# Patient Record
Sex: FEMALE | Race: WHITE | Employment: UNEMPLOYED | URBAN - METROPOLITAN AREA
[De-identification: names, ages, dates, MRNs, and addresses within clinical notes are randomized per-mention and may not be internally consistent; named-entity substitution may affect disease eponyms.]

---

## 2019-08-15 ENCOUNTER — APPOINTMENT (OUTPATIENT)
Dept: CT IMAGING | Age: 54
DRG: 917 | End: 2019-08-15
Payer: MEDICARE

## 2019-08-15 ENCOUNTER — HOSPITAL ENCOUNTER (INPATIENT)
Age: 54
LOS: 2 days | Discharge: HOME | DRG: 917 | End: 2019-08-17
Attending: EMERGENCY MEDICINE | Admitting: INTERNAL MEDICINE
Payer: MEDICARE

## 2019-08-15 ENCOUNTER — APPOINTMENT (OUTPATIENT)
Dept: GENERAL RADIOLOGY | Age: 54
DRG: 917 | End: 2019-08-15
Payer: MEDICARE

## 2019-08-15 DIAGNOSIS — F06.1 CATATONIA: Primary | ICD-10-CM

## 2019-08-15 PROBLEM — F31.9 BIPOLAR 1 DISORDER (HCC): Status: ACTIVE | Noted: 2019-08-15

## 2019-08-15 PROBLEM — R41.89 UNRESPONSIVE EPISODE: Status: ACTIVE | Noted: 2019-08-15

## 2019-08-15 PROBLEM — R40.4 UNRESPONSIVE EPISODE: Status: ACTIVE | Noted: 2019-08-15

## 2019-08-15 LAB
A/G RATIO: 1.7 (ref 1.1–2.2)
ACETAMINOPHEN LEVEL: <5 UG/ML (ref 10–30)
ALBUMIN SERPL-MCNC: 4.9 G/DL (ref 3.4–5)
ALP BLD-CCNC: 88 U/L (ref 40–129)
ALT SERPL-CCNC: 9 U/L (ref 10–40)
AMPHETAMINE SCREEN, URINE: POSITIVE
ANION GAP SERPL CALCULATED.3IONS-SCNC: 12 MMOL/L (ref 3–16)
AST SERPL-CCNC: 16 U/L (ref 15–37)
BACTERIA: ABNORMAL /HPF
BARBITURATE SCREEN URINE: ABNORMAL
BASOPHILS ABSOLUTE: 0.1 K/UL (ref 0–0.2)
BASOPHILS RELATIVE PERCENT: 1.3 %
BENZODIAZEPINE SCREEN, URINE: ABNORMAL
BILIRUB SERPL-MCNC: 0.7 MG/DL (ref 0–1)
BILIRUBIN URINE: NEGATIVE
BLOOD, URINE: ABNORMAL
BUN BLDV-MCNC: 12 MG/DL (ref 7–20)
CALCIUM SERPL-MCNC: 10.2 MG/DL (ref 8.3–10.6)
CANNABINOID SCREEN URINE: ABNORMAL
CHLORIDE BLD-SCNC: 101 MMOL/L (ref 99–110)
CLARITY: CLEAR
CO2: 27 MMOL/L (ref 21–32)
COCAINE METABOLITE SCREEN URINE: POSITIVE
COLOR: YELLOW
CREAT SERPL-MCNC: 1 MG/DL (ref 0.6–1.1)
EOSINOPHILS ABSOLUTE: 0 K/UL (ref 0–0.6)
EOSINOPHILS RELATIVE PERCENT: 0.5 %
EPITHELIAL CELLS, UA: ABNORMAL /HPF
ETHANOL: NORMAL MG/DL (ref 0–0.08)
GFR AFRICAN AMERICAN: >60
GFR NON-AFRICAN AMERICAN: 58
GLOBULIN: 2.9 G/DL
GLUCOSE BLD-MCNC: 117 MG/DL (ref 70–99)
GLUCOSE BLD-MCNC: 128 MG/DL (ref 70–99)
GLUCOSE URINE: NEGATIVE MG/DL
HCG QUALITATIVE: NEGATIVE
HCT VFR BLD CALC: 42 % (ref 36–48)
HEMOGLOBIN: 14.7 G/DL (ref 12–16)
KETONES, URINE: ABNORMAL MG/DL
LEUKOCYTE ESTERASE, URINE: ABNORMAL
LYMPHOCYTES ABSOLUTE: 1.7 K/UL (ref 1–5.1)
LYMPHOCYTES RELATIVE PERCENT: 24.7 %
Lab: ABNORMAL
MCH RBC QN AUTO: 30.2 PG (ref 26–34)
MCHC RBC AUTO-ENTMCNC: 35 G/DL (ref 31–36)
MCV RBC AUTO: 86.3 FL (ref 80–100)
METHADONE SCREEN, URINE: ABNORMAL
MICROSCOPIC EXAMINATION: YES
MONOCYTES ABSOLUTE: 0.8 K/UL (ref 0–1.3)
MONOCYTES RELATIVE PERCENT: 11.6 %
MUCUS: ABNORMAL /LPF
NEUTROPHILS ABSOLUTE: 4.2 K/UL (ref 1.7–7.7)
NEUTROPHILS RELATIVE PERCENT: 61.9 %
NITRITE, URINE: POSITIVE
OPIATE SCREEN URINE: ABNORMAL
OXYCODONE URINE: ABNORMAL
PDW BLD-RTO: 12.9 % (ref 12.4–15.4)
PERFORMED ON: ABNORMAL
PH UA: 6
PH UA: 6 (ref 5–8)
PHENCYCLIDINE SCREEN URINE: ABNORMAL
PLATELET # BLD: 301 K/UL (ref 135–450)
PMV BLD AUTO: 8.3 FL (ref 5–10.5)
POTASSIUM SERPL-SCNC: 3.9 MMOL/L (ref 3.5–5.1)
PROPOXYPHENE SCREEN: ABNORMAL
PROTEIN UA: ABNORMAL MG/DL
RBC # BLD: 4.86 M/UL (ref 4–5.2)
RBC UA: ABNORMAL /HPF (ref 0–2)
SALICYLATE, SERUM: <0.3 MG/DL (ref 15–30)
SODIUM BLD-SCNC: 140 MMOL/L (ref 136–145)
SPECIFIC GRAVITY UA: 1.02 (ref 1–1.03)
TOTAL CK: 150 U/L (ref 26–192)
TOTAL CK: 151 U/L (ref 26–192)
TOTAL PROTEIN: 7.8 G/DL (ref 6.4–8.2)
TROPONIN: <0.01 NG/ML
URINE TYPE: ABNORMAL
UROBILINOGEN, URINE: 0.2 E.U./DL
WBC # BLD: 6.7 K/UL (ref 4–11)
WBC UA: ABNORMAL /HPF (ref 0–5)

## 2019-08-15 PROCEDURE — 80307 DRUG TEST PRSMV CHEM ANLYZR: CPT

## 2019-08-15 PROCEDURE — 82550 ASSAY OF CK (CPK): CPT

## 2019-08-15 PROCEDURE — 81001 URINALYSIS AUTO W/SCOPE: CPT

## 2019-08-15 PROCEDURE — 80053 COMPREHEN METABOLIC PANEL: CPT

## 2019-08-15 PROCEDURE — 71045 X-RAY EXAM CHEST 1 VIEW: CPT

## 2019-08-15 PROCEDURE — 36415 COLL VENOUS BLD VENIPUNCTURE: CPT

## 2019-08-15 PROCEDURE — 99285 EMERGENCY DEPT VISIT HI MDM: CPT

## 2019-08-15 PROCEDURE — 6360000002 HC RX W HCPCS: Performed by: EMERGENCY MEDICINE

## 2019-08-15 PROCEDURE — 93005 ELECTROCARDIOGRAM TRACING: CPT | Performed by: EMERGENCY MEDICINE

## 2019-08-15 PROCEDURE — 80175 DRUG SCREEN QUAN LAMOTRIGINE: CPT

## 2019-08-15 PROCEDURE — 96365 THER/PROPH/DIAG IV INF INIT: CPT

## 2019-08-15 PROCEDURE — C9113 INJ PANTOPRAZOLE SODIUM, VIA: HCPCS | Performed by: INTERNAL MEDICINE

## 2019-08-15 PROCEDURE — G0480 DRUG TEST DEF 1-7 CLASSES: HCPCS

## 2019-08-15 PROCEDURE — 84703 CHORIONIC GONADOTROPIN ASSAY: CPT

## 2019-08-15 PROCEDURE — 6370000000 HC RX 637 (ALT 250 FOR IP)

## 2019-08-15 PROCEDURE — 84484 ASSAY OF TROPONIN QUANT: CPT

## 2019-08-15 PROCEDURE — 2580000003 HC RX 258: Performed by: EMERGENCY MEDICINE

## 2019-08-15 PROCEDURE — 2000000000 HC ICU R&B

## 2019-08-15 PROCEDURE — 2580000003 HC RX 258: Performed by: INTERNAL MEDICINE

## 2019-08-15 PROCEDURE — 70450 CT HEAD/BRAIN W/O DYE: CPT

## 2019-08-15 PROCEDURE — 85025 COMPLETE CBC W/AUTO DIFF WBC: CPT

## 2019-08-15 PROCEDURE — 6360000002 HC RX W HCPCS: Performed by: INTERNAL MEDICINE

## 2019-08-15 RX ORDER — HALOPERIDOL 5 MG/ML
5 INJECTION INTRAMUSCULAR ONCE
Status: DISCONTINUED | OUTPATIENT
Start: 2019-08-15 | End: 2019-08-17

## 2019-08-15 RX ORDER — PANTOPRAZOLE SODIUM 40 MG/10ML
40 INJECTION, POWDER, LYOPHILIZED, FOR SOLUTION INTRAVENOUS DAILY
Status: DISCONTINUED | OUTPATIENT
Start: 2019-08-15 | End: 2019-08-17 | Stop reason: HOSPADM

## 2019-08-15 RX ORDER — CIPROFLOXACIN 2 MG/ML
400 INJECTION, SOLUTION INTRAVENOUS EVERY 12 HOURS
Status: DISCONTINUED | OUTPATIENT
Start: 2019-08-16 | End: 2019-08-16

## 2019-08-15 RX ORDER — SODIUM CHLORIDE 0.9 % (FLUSH) 0.9 %
10 SYRINGE (ML) INJECTION EVERY 12 HOURS SCHEDULED
Status: DISCONTINUED | OUTPATIENT
Start: 2019-08-15 | End: 2019-08-17 | Stop reason: HOSPADM

## 2019-08-15 RX ORDER — AMMONIA INHALANTS 0.04 G/.3ML
INHALANT RESPIRATORY (INHALATION)
Status: COMPLETED
Start: 2019-08-15 | End: 2019-08-15

## 2019-08-15 RX ORDER — SODIUM CHLORIDE 9 MG/ML
INJECTION, SOLUTION INTRAVENOUS CONTINUOUS
Status: DISCONTINUED | OUTPATIENT
Start: 2019-08-15 | End: 2019-08-17

## 2019-08-15 RX ORDER — SODIUM CHLORIDE 0.9 % (FLUSH) 0.9 %
10 SYRINGE (ML) INJECTION PRN
Status: DISCONTINUED | OUTPATIENT
Start: 2019-08-15 | End: 2019-08-17 | Stop reason: HOSPADM

## 2019-08-15 RX ORDER — ONDANSETRON 2 MG/ML
4 INJECTION INTRAMUSCULAR; INTRAVENOUS EVERY 6 HOURS PRN
Status: DISCONTINUED | OUTPATIENT
Start: 2019-08-15 | End: 2019-08-16

## 2019-08-15 RX ORDER — 0.9 % SODIUM CHLORIDE 0.9 %
1000 INTRAVENOUS SOLUTION INTRAVENOUS ONCE
Status: COMPLETED | OUTPATIENT
Start: 2019-08-15 | End: 2019-08-15

## 2019-08-15 RX ORDER — AMMONIA INHALANTS 0.04 G/.3ML
0.3 INHALANT RESPIRATORY (INHALATION) ONCE
Status: COMPLETED | OUTPATIENT
Start: 2019-08-15 | End: 2019-08-15

## 2019-08-15 RX ORDER — CIPROFLOXACIN 2 MG/ML
400 INJECTION, SOLUTION INTRAVENOUS ONCE
Status: COMPLETED | OUTPATIENT
Start: 2019-08-15 | End: 2019-08-15

## 2019-08-15 RX ADMIN — CIPROFLOXACIN 400 MG: 2 INJECTION, SOLUTION INTRAVENOUS at 14:37

## 2019-08-15 RX ADMIN — PANTOPRAZOLE SODIUM 40 MG: 40 INJECTION, POWDER, FOR SOLUTION INTRAVENOUS at 17:39

## 2019-08-15 RX ADMIN — SODIUM CHLORIDE: 9 INJECTION, SOLUTION INTRAVENOUS at 17:38

## 2019-08-15 RX ADMIN — AMMONIA INHALANTS 0.3 ML: 0.04 INHALANT RESPIRATORY (INHALATION) at 13:11

## 2019-08-15 RX ADMIN — ENOXAPARIN SODIUM 40 MG: 40 INJECTION SUBCUTANEOUS at 17:38

## 2019-08-15 RX ADMIN — Medication 0.3 ML: at 13:11

## 2019-08-15 RX ADMIN — Medication 10 ML: at 17:39

## 2019-08-15 RX ADMIN — AMMONIA INHALANTS 0.3 ML: 0.04 INHALANT RESPIRATORY (INHALATION) at 11:55

## 2019-08-15 RX ADMIN — SODIUM CHLORIDE 1000 ML: 9 INJECTION, SOLUTION INTRAVENOUS at 14:36

## 2019-08-15 NOTE — H&P
Hospital Medicine History & Physical      PCP: Socrates Davalos MD    Date of Admission: 8/15/2019    Date of Service: Pt seen/examined on 8/15/2019 and Admitted to Inpatient with expected LOS greater than two midnights due to medical therapy. Chief Complaint: Change in mental status      History Of Present Illness:    49 y.o. female who presented to [facility] with above complaint. History is from her  fiancée. She was apparently well until this morning. He dropped her at her mother's place. She went to [place] with her mother who lives in a nursing home. She became unresponsive and calcium no and they transferred her over here. She has been having these kind of episodes on and off for the last month. She had 3 episodes so far. Lastly she was managed in the ER at [facility]. Her mental status improved during the stay in the ER and she was discharged at that time. She has been tightening the jaws and all the extremities currently and unable to get any history or examine her. The history of bipolar and does not take any medications. Her all the oldest daughter  got the custody of her youngest daughter who is 15years old in last May. .. Since then she has been very upset. She snorts cocaine and takes amphetamine. Past Medical History:          Diagnosis Date    DDD (degenerative disc disease), lumbosacral 1/15/2014    Depression     Low back pain     HERNIATED DISC    Ovarian cyst        Past Surgical History:          Procedure Laterality Date    BONY PELVIS SURGERY      LEFT SIDE FOR BONE GRAFT FOR NECK    BREAST BIOPSY      RIGHT CYST NEG    ENDOMETRIAL ABLATION      LEEP      X2    NECK SURGERY      OTHER SURGICAL HISTORY      SPINAL CORD STIMULATOR    TUBAL LIGATION         Medications Prior to Admission:      Prior to Admission medications    Medication Sig Start Date End Date Taking? Authorizing Provider   fluocinonide (LIDEX) 0.05 % cream Apply topically 2 times daily. 2/25/15   Michaelle Solders, APRN - CNP   triamcinolone (KENALOG) 0.025 % cream Apply topically 2 times daily. 12/31/14   Omega Cheng MD   benztropine (COGENTIN) 1 MG tablet  5/30/14   Historical Provider, MD   ARIPiprazole (ABILIFY) 5 MG tablet Take 10 mg by mouth daily. Historical Provider, MD   traMADol (ULTRAM) 50 MG tablet Take 1 tablet by mouth every 4 hours. 1/15/14   Omega Cheng MD       Allergies:  Amoxicillin-pot clavulanate    Social History:      The patient currently lives with her boyfriend    TOBACCO:   reports that she has been smoking cigarettes. She has been smoking about 1.00 pack per day. She has never used smokeless tobacco.  ETOH:   reports that she drinks alcohol. Family History:       Reviewed in detail and negative for DM, CAD, Cancer, CVA. Positive as follows:        Problem Relation Age of Onset    Cancer Unknown     Diabetes Unknown     Heart Disease Unknown     High Blood Pressure Unknown     Stroke Unknown        REVIEW OF SYSTEMS:   Pertinent positives as noted in the HPI. All other systems reviewed and negative. PHYSICAL EXAM PERFORMED:    BP (!) 142/84   Pulse 84   Temp 99 °F (37.2 °C) (Axillary)   Resp 17   SpO2 100%     General appearance:   Clinching the jaws, all the extremities and tightening her eyes      HEENT: Could not open her eyes. Neck: . No jugular venous distention. Trachea midline. Respiratory:  Normal respiratory effort. Clear to auscultation, bilaterally without Rales/Wheezes/Rhonchi. Cardiovascular:  Regular rate and rhythm with normal S1/S2 without murmurs, rubs or gallops. Abdomen: Soft, non-tender, non-distended with normal bowel sounds. Musculoskeletal:  No clubbing, cyanosis or edema bilaterally. Tightening hands and feet , extremities rigid  skin: Skin color, texture, turgor normal.  No rashes or lesions.   Neurologic: Not responding, she responded to ammonia in the ER  Psychiatric: Catatonic state  Capillary Refill: Brisk,< 3 seconds   Peripheral Pulses: +2 palpable, equal bilaterally       Labs:     Recent Labs     08/15/19  1140   WBC 6.7   HGB 14.7   HCT 42.0        Recent Labs     08/15/19  1140      K 3.9      CO2 27   BUN 12   CREATININE 1.0   CALCIUM 10.2     Recent Labs     08/15/19  1140   AST 16   ALT 9*   BILITOT 0.7   ALKPHOS 88     No results for input(s): INR in the last 72 hours. No results for input(s): Coralyn Honour in the last 72 hours. Urinalysis:      Lab Results   Component Value Date    NITRU POSITIVE 08/15/2019    WBCUA  08/15/2019    BACTERIA 4+ 08/15/2019    RBCUA 5-10 08/15/2019    BLOODU SMALL 08/15/2019    SPECGRAV 1.025 08/15/2019    GLUCOSEU Negative 08/15/2019       Radiology:     CXR: I have reviewed the CXR with the following interpretation:   EKG:  I have reviewed the EKG with the following interpretation: Sinus tachycardia 106/min, left bundle branch block which is old    XR CHEST PORTABLE   Final Result   No acute cardiopulmonary pathology. 4 cm nodular density projected over the left lower lobe/left upper quadrant. Further evaluation with chest CT suggested on a nonemergent/outpatient basis. CT Head WO Contrast   Final Result   No acute intracranial abnormality.              ASSESSMENT:    Active Hospital Problems    Diagnosis Date Noted    Unresponsive episode [R41.89] 08/15/2019    Bipolar 1 disorder ([facility] [F31.9] 08/15/2019         PLAN:  Unresponsive , catatonic state-recurrence, mostly psychological, no abnormalities found in the work-up including CAT scan of the head, she responded to ammonia in the emergency room, CPK is normal seizure less likely, given the history of cocaine and amphetamine abuse-need to monitor closely  -Admit to ICU, IV fluids, IV Protonix, monitor electrolytes, neurochecks, neurology and psychiatric evaluation, discussed with neurology, if condition does not improve consider EEG and Keppra better not to give Ativan or Haldol    BiPolar disorder-does not take any medications    Amphetamine and cocaine abuse-telemetry monitoring, symptom medic treatment    Abnormal EI-elujew-fg urine culture and Cipro IV    Left bundle branch block- old            DVT Prophylaxis: Lovenox  Diet: N.p.o.  Code Status: Full code    PT/OT Eval Status:      Dispo -admitted to ICU  Addendum  Discussed with the nurse at the ICU- she was in a relaxed state  when no one is  in the room, is somewhat anxious she become catatonic     Martha Beasley MD    Thank you Mariajose Avelar MD for the opportunity to be involved in this patient's care. If you have any questions or concerns please feel free to contact me at [phone].

## 2019-08-15 NOTE — ED NOTES
Bed: 01  Expected date:   Expected time:   Means of arrival:   Comments:  SOLEDAD Erickson  08/15/19 1127

## 2019-08-15 NOTE — PROGRESS NOTES
Psych consult: Perfect serve gave number [phone] said Psych coverage wated to be reached at this number. Message was left.

## 2019-08-16 PROBLEM — F14.10 COCAINE ABUSE (HCC): Status: ACTIVE | Noted: 2019-08-16

## 2019-08-16 PROBLEM — F19.959 SUBSTANCE-INDUCED PSYCHOTIC DISORDER (HCC): Status: ACTIVE | Noted: 2019-08-16

## 2019-08-16 PROBLEM — F15.10 METHAMPHETAMINE ABUSE (HCC): Chronic | Status: ACTIVE | Noted: 2019-08-16

## 2019-08-16 PROBLEM — F43.10 PTSD (POST-TRAUMATIC STRESS DISORDER): Status: ACTIVE | Noted: 2019-08-16

## 2019-08-16 LAB
ALBUMIN SERPL-MCNC: 3.9 G/DL (ref 3.4–5)
ALP BLD-CCNC: 72 U/L (ref 40–129)
ALT SERPL-CCNC: 10 U/L (ref 10–40)
ANION GAP SERPL CALCULATED.3IONS-SCNC: 9 MMOL/L (ref 3–16)
AST SERPL-CCNC: 18 U/L (ref 15–37)
BASOPHILS ABSOLUTE: 0.1 K/UL (ref 0–0.2)
BASOPHILS RELATIVE PERCENT: 1.2 %
BILIRUB SERPL-MCNC: 0.8 MG/DL (ref 0–1)
BILIRUBIN DIRECT: <0.2 MG/DL (ref 0–0.3)
BILIRUBIN, INDIRECT: NORMAL MG/DL (ref 0–1)
BUN BLDV-MCNC: 9 MG/DL (ref 7–20)
CALCIUM SERPL-MCNC: 9.2 MG/DL (ref 8.3–10.6)
CHLORIDE BLD-SCNC: 108 MMOL/L (ref 99–110)
CO2: 24 MMOL/L (ref 21–32)
CREAT SERPL-MCNC: 0.8 MG/DL (ref 0.6–1.1)
EKG ATRIAL RATE: 106 BPM
EKG DIAGNOSIS: NORMAL
EKG P AXIS: 77 DEGREES
EKG P-R INTERVAL: 128 MS
EKG Q-T INTERVAL: 386 MS
EKG QRS DURATION: 120 MS
EKG QTC CALCULATION (BAZETT): 512 MS
EKG R AXIS: -21 DEGREES
EKG T AXIS: 100 DEGREES
EKG VENTRICULAR RATE: 106 BPM
EOSINOPHILS ABSOLUTE: 0.1 K/UL (ref 0–0.6)
EOSINOPHILS RELATIVE PERCENT: 2.1 %
GFR AFRICAN AMERICAN: >60
GFR NON-AFRICAN AMERICAN: >60
GLUCOSE BLD-MCNC: 101 MG/DL (ref 70–99)
HCT VFR BLD CALC: 38.8 % (ref 36–48)
HEMOGLOBIN: 13.3 G/DL (ref 12–16)
LACTIC ACID: 0.7 MMOL/L (ref 0.4–2)
LYMPHOCYTES ABSOLUTE: 2.1 K/UL (ref 1–5.1)
LYMPHOCYTES RELATIVE PERCENT: 39.3 %
MAGNESIUM: 2 MG/DL (ref 1.8–2.4)
MCH RBC QN AUTO: 29.8 PG (ref 26–34)
MCHC RBC AUTO-ENTMCNC: 34.3 G/DL (ref 31–36)
MCV RBC AUTO: 87.1 FL (ref 80–100)
MONOCYTES ABSOLUTE: 0.6 K/UL (ref 0–1.3)
MONOCYTES RELATIVE PERCENT: 12.1 %
NEUTROPHILS ABSOLUTE: 2.4 K/UL (ref 1.7–7.7)
NEUTROPHILS RELATIVE PERCENT: 45.3 %
PDW BLD-RTO: 12.6 % (ref 12.4–15.4)
PLATELET # BLD: 257 K/UL (ref 135–450)
PMV BLD AUTO: 8.4 FL (ref 5–10.5)
POTASSIUM REFLEX MAGNESIUM: 3.7 MMOL/L (ref 3.5–5.1)
RBC # BLD: 4.45 M/UL (ref 4–5.2)
SODIUM BLD-SCNC: 141 MMOL/L (ref 136–145)
TOTAL PROTEIN: 6.4 G/DL (ref 6.4–8.2)
WBC # BLD: 5.4 K/UL (ref 4–11)

## 2019-08-16 PROCEDURE — 80076 HEPATIC FUNCTION PANEL: CPT

## 2019-08-16 PROCEDURE — 6370000000 HC RX 637 (ALT 250 FOR IP): Performed by: INTERNAL MEDICINE

## 2019-08-16 PROCEDURE — 93010 ELECTROCARDIOGRAM REPORT: CPT | Performed by: INTERNAL MEDICINE

## 2019-08-16 PROCEDURE — 85025 COMPLETE CBC W/AUTO DIFF WBC: CPT

## 2019-08-16 PROCEDURE — 2580000003 HC RX 258: Performed by: INTERNAL MEDICINE

## 2019-08-16 PROCEDURE — 83735 ASSAY OF MAGNESIUM: CPT

## 2019-08-16 PROCEDURE — 80048 BASIC METABOLIC PNL TOTAL CA: CPT

## 2019-08-16 PROCEDURE — 6360000002 HC RX W HCPCS: Performed by: INTERNAL MEDICINE

## 2019-08-16 PROCEDURE — 83605 ASSAY OF LACTIC ACID: CPT

## 2019-08-16 PROCEDURE — 36415 COLL VENOUS BLD VENIPUNCTURE: CPT

## 2019-08-16 PROCEDURE — 1200000000 HC SEMI PRIVATE

## 2019-08-16 PROCEDURE — 99223 1ST HOSP IP/OBS HIGH 75: CPT | Performed by: PSYCHIATRY & NEUROLOGY

## 2019-08-16 PROCEDURE — C9113 INJ PANTOPRAZOLE SODIUM, VIA: HCPCS | Performed by: INTERNAL MEDICINE

## 2019-08-16 PROCEDURE — 99222 1ST HOSP IP/OBS MODERATE 55: CPT | Performed by: PSYCHIATRY & NEUROLOGY

## 2019-08-16 PROCEDURE — 87086 URINE CULTURE/COLONY COUNT: CPT

## 2019-08-16 RX ORDER — DOXYCYCLINE HYCLATE 100 MG
100 TABLET ORAL EVERY 12 HOURS SCHEDULED
Status: DISCONTINUED | OUTPATIENT
Start: 2019-08-16 | End: 2019-08-17

## 2019-08-16 RX ORDER — PROCHLORPERAZINE EDISYLATE 5 MG/ML
10 INJECTION INTRAMUSCULAR; INTRAVENOUS EVERY 6 HOURS PRN
Status: DISCONTINUED | OUTPATIENT
Start: 2019-08-16 | End: 2019-08-17 | Stop reason: HOSPADM

## 2019-08-16 RX ORDER — RISPERIDONE 1 MG/1
1 TABLET, FILM COATED ORAL 2 TIMES DAILY
Status: DISCONTINUED | OUTPATIENT
Start: 2019-08-16 | End: 2019-08-17 | Stop reason: HOSPADM

## 2019-08-16 RX ORDER — HALOPERIDOL 5 MG/ML
5 INJECTION INTRAMUSCULAR ONCE
Status: COMPLETED | OUTPATIENT
Start: 2019-08-16 | End: 2019-08-16

## 2019-08-16 RX ORDER — HYDROXYZINE PAMOATE 50 MG/1
50 CAPSULE ORAL 4 TIMES DAILY PRN
COMMUNITY

## 2019-08-16 RX ORDER — RISPERIDONE 1 MG/1
1 TABLET, FILM COATED ORAL 2 TIMES DAILY
COMMUNITY

## 2019-08-16 RX ORDER — SODIUM CHLORIDE 9 MG/ML
INJECTION, SOLUTION INTRAVENOUS CONTINUOUS
Status: CANCELLED | OUTPATIENT
Start: 2019-08-16

## 2019-08-16 RX ORDER — LAMOTRIGINE 25 MG/1
25 TABLET ORAL 2 TIMES DAILY
Status: DISCONTINUED | OUTPATIENT
Start: 2019-08-16 | End: 2019-08-17 | Stop reason: HOSPADM

## 2019-08-16 RX ORDER — LAMOTRIGINE 25 MG/1
25 TABLET ORAL 2 TIMES DAILY
COMMUNITY

## 2019-08-16 RX ORDER — NICOTINE 21 MG/24HR
1 PATCH, TRANSDERMAL 24 HOURS TRANSDERMAL DAILY
Status: DISCONTINUED | OUTPATIENT
Start: 2019-08-16 | End: 2019-08-17 | Stop reason: HOSPADM

## 2019-08-16 RX ORDER — HYDROXYZINE PAMOATE 25 MG/1
50 CAPSULE ORAL 4 TIMES DAILY PRN
Status: DISCONTINUED | OUTPATIENT
Start: 2019-08-16 | End: 2019-08-17 | Stop reason: HOSPADM

## 2019-08-16 RX ADMIN — DOXYCYCLINE HYCLATE 100 MG: 100 TABLET, COATED ORAL at 20:54

## 2019-08-16 RX ADMIN — SODIUM CHLORIDE: 9 INJECTION, SOLUTION INTRAVENOUS at 01:47

## 2019-08-16 RX ADMIN — LAMOTRIGINE 25 MG: 25 TABLET ORAL at 11:02

## 2019-08-16 RX ADMIN — PANTOPRAZOLE SODIUM 40 MG: 40 INJECTION, POWDER, FOR SOLUTION INTRAVENOUS at 08:38

## 2019-08-16 RX ADMIN — MUPIROCIN: 20 OINTMENT TOPICAL at 08:37

## 2019-08-16 RX ADMIN — CIPROFLOXACIN 400 MG: 2 INJECTION, SOLUTION INTRAVENOUS at 04:05

## 2019-08-16 RX ADMIN — HALOPERIDOL LACTATE 5 MG: 5 INJECTION INTRAMUSCULAR at 00:48

## 2019-08-16 RX ADMIN — Medication 10 ML: at 08:38

## 2019-08-16 RX ADMIN — RISPERIDONE 1 MG: 1 TABLET, FILM COATED ORAL at 20:54

## 2019-08-16 RX ADMIN — ENOXAPARIN SODIUM 40 MG: 40 INJECTION SUBCUTANEOUS at 08:37

## 2019-08-16 RX ADMIN — Medication 10 ML: at 20:55

## 2019-08-16 RX ADMIN — RISPERIDONE 1 MG: 1 TABLET, FILM COATED ORAL at 11:03

## 2019-08-16 RX ADMIN — LAMOTRIGINE 25 MG: 25 TABLET ORAL at 20:54

## 2019-08-16 RX ADMIN — DOXYCYCLINE HYCLATE 100 MG: 100 TABLET, COATED ORAL at 11:02

## 2019-08-16 ASSESSMENT — PAIN SCALES - GENERAL: PAINLEVEL_OUTOF10: 0

## 2019-08-16 NOTE — PLAN OF CARE
Problem: Falls - Risk of:  Goal: Will remain free from falls  Description  Will remain free from falls  8/16/2019 1010 by Suni Doll RN  Outcome: Ongoing  Note:   Pt remains free from fall and injury. Non-skid slippers on. Fall risk band on wrist, bed alarm in use. Instructed to call for assistance. Call light in reach, will monitor.     8/15/2019 2038 by Melody Siu RN  Outcome: Ongoing  Goal: Absence of physical injury  Description  Absence of physical injury  8/15/2019 2038 by Melody Siu RN  Outcome: Ongoing

## 2019-08-16 NOTE — CARE COORDINATION
Sw reviewed chart on this day and notes that psych has been consulted. Pt has Medicare and Medicaid which should reduce any potential barriers to discharge planning needs. Sw will follow and assist as able.

## 2019-08-16 NOTE — PROGRESS NOTES
([facility] [F19.10]     Unresponsive episode [R41.89] 08/15/2019    Bipolar 1 disorder ([facility] [F31.9] 08/15/2019     Assessment/Plan:  1. Acute Encephalopathy likely Toxic in the setting of Polysubstance abuse (Coaine, Amphetamine , Tobacco )   -CT head negative . Possibly underlying psychiatric condition could also be playing a role.  -Labs unremarkable except for UDS positive for cocaine and amphetamine  -Neurology and psychiatric consulted from ED -await recommendations  -Counseled about abstinence and ordered nicotine patch     2. Bipolar schizophrenia - -Patient reports that she follows with [facility] behavioral health and her fiancé reports patient has been treated for bipolar and schizophrenia . Patient reports that she has been weaned off Abilify and currently on risperidone and  Lamictal    3. Abnormal UA- follow-up urine culture and empirically started on Cipro IV-which got transition to doxycycline given prolonged QTC on admission EKG   -Monitor on telemetry ; DC antibiotics if urine cultures negative.     DVT Prophylaxis: Lovenox  Diet: DIET GENERAL;  Code Status: Full Code    PT/OT Eval Status: Consulted    Dispo -likely tomorrow pending urine cultures and PT OT evaluation for discharge needs; Subspeciality recs        The note was completed using Dragon -speech recognition software & EMR  . Every effort was made to ensure accuracy; however, inadvertent computerized transcription errors may be present.     Latonia Spring MD

## 2019-08-16 NOTE — CONSULTS
Psychiatry Consult    Dx:   1. Methamphetamine/Cocaine Abuse  2. Bipolar DO per history  3. PTSD    Rec:  1. Met with patient and her fiabhinav, and patient has years of methamphetamine abuse, as he will use daily until her Y[employer] Spark runs out. \" She denied buying cocaine but david uses cocaine and gives it to her . 2. Currently in treatment at [facility], and was in [facility] for inpatient care 4/2019 and also drug treatment there but has not followed through. 3. David stated that patient has episodes of not responding, tightens up body, and hears voices. Patient described voices daily of various people that she knows. 4. At this point patient's symptoms are consistent with methamphetamine abuse. 5. Recommended outpt drug treatment in addition to her psychiatric care. Patient does not appear committed to treatment. 6. Does not require inpatient psychiatric tx at this time and recommend dc home once medically stable.
Sherri, 08/15/2019;  VITAL SIGNS:  Temperature 97.6, pulse 56, respirations 14, blood  pressure 118/80. MENTAL STATUS EXAMINATION:  The patient is a 67-year-old female who is  in bed. She appeared well nourished. She was present with her fiance. She spoke freely and openly. Her thoughts were coherent and logical.   Her affect was constricted. She said her mood was okay. Insight and  judgment impaired. She denied auditory or visual hallucinations. Nor  any suicidal or homicidal ideation at this time. Fund of knowledge and  language was fair. Attention and concentration was fair. Able to  recall 3 objects immediately. She did not show any abnormal movements. She did not appear to be confused and was cooperative and pleasant. DIAGNOSES:  AXIS I:  1. Substance-induced psychotic disorder, resolved. 2.  Methamphetamine abuse. 3.  Cocaine abuse. 4.  Bipolar disorder per history. 5.  Posttraumatic stress disorder. AXIS II: Deferred. AXIS III:  1. Degenerative disk disease. 2. Recent catatonia. AXIS IV: Moderate. AXIS V: 50. PLAN:  1. Continue with Risperdal 1 mg b.i.d.  2.  Encourage outpatient treatment with her psychiatrist and therapist  at [facility]. 3.  See recommendations in chart. I do have some concerns because the  patient does not appear to be committed to treatment and certainly not  for substance abuse. I also have concerns that william maybe supplying  her with certain medications at times including benzodiazepines when  anxious and cocaine. Many of her behaviors, periods of agitation and  psychotic symptoms maybe directly related to her abusive stimulants. Spent approximately 110 minutes on this evaluation, more than 50% of the  time discussing the patient's care and treatment options.         Shabana Mcclure MD    D: 08/16/2019 12:29:10       T: 08/16/2019 14:53:53     [email or web address]  Job#: [id number]     Doc#: [id number]    CC:

## 2019-08-17 VITALS
TEMPERATURE: 98 F | HEART RATE: 63 BPM | RESPIRATION RATE: 16 BRPM | BODY MASS INDEX: 23.23 KG/M2 | DIASTOLIC BLOOD PRESSURE: 71 MMHG | SYSTOLIC BLOOD PRESSURE: 108 MMHG | WEIGHT: 162.26 LBS | HEIGHT: 70 IN | OXYGEN SATURATION: 99 %

## 2019-08-17 LAB
LAMOTRIGINE LEVEL: 1.3 UG/ML (ref 2.5–15)
URINE CULTURE, ROUTINE: NORMAL

## 2019-08-17 PROCEDURE — 97165 OT EVAL LOW COMPLEX 30 MIN: CPT

## 2019-08-17 PROCEDURE — 6370000000 HC RX 637 (ALT 250 FOR IP): Performed by: INTERNAL MEDICINE

## 2019-08-17 PROCEDURE — 97161 PT EVAL LOW COMPLEX 20 MIN: CPT

## 2019-08-17 PROCEDURE — C9113 INJ PANTOPRAZOLE SODIUM, VIA: HCPCS | Performed by: INTERNAL MEDICINE

## 2019-08-17 PROCEDURE — 6360000002 HC RX W HCPCS: Performed by: INTERNAL MEDICINE

## 2019-08-17 RX ADMIN — ENOXAPARIN SODIUM 40 MG: 40 INJECTION SUBCUTANEOUS at 09:19

## 2019-08-17 RX ADMIN — RISPERIDONE 1 MG: 1 TABLET, FILM COATED ORAL at 09:19

## 2019-08-17 RX ADMIN — LAMOTRIGINE 25 MG: 25 TABLET ORAL at 09:19

## 2019-08-17 RX ADMIN — PANTOPRAZOLE SODIUM 40 MG: 40 INJECTION, POWDER, FOR SOLUTION INTRAVENOUS at 09:18

## 2019-08-17 ASSESSMENT — PAIN SCALES - GENERAL
PAINLEVEL_OUTOF10: 0
PAINLEVEL_OUTOF10: 0

## 2019-08-17 NOTE — PROGRESS NOTES
Physical Therapy    Facility/Department: [place] [address] [facility]  Initial Assessment and Discharge Summary    NAME: Rosibel Garza  : 1965  MRN: [id number]    Date of Service: 2019    Discharge Recommendations:  Home with assist PRN   PT Equipment Recommendations  Equipment Needed: No    Assessment   Assessment: Patient is a 49year old female presenting to the hospital due to altered mental status. She ambulated 250 ft and completed a flight of stairs independently. She does not need skilled therapy in the acute setting to DC home safely. Decision Making: Low Complexity  PT Education: PT Role;General Safety  Barriers to Learning: None  No Skilled PT: Independent with functional mobility   REQUIRES PT FOLLOW UP: No  Activity Tolerance  Activity Tolerance: Patient Tolerated treatment well       Patient Diagnosis(es): The encounter diagnosis was Catatonia. has a past medical history of DDD (degenerative disc disease), lumbosacral, Depression, Low back pain, and Ovarian cyst.   has a past surgical history that includes Neck surgery; Tubal ligation; Bony pelvis surgery; LEEP; Breast biopsy; Endometrial ablation; and other surgical history. Restrictions  Restrictions/Precautions  Restrictions/Precautions: General Precautions  Required Braces or Orthoses?: No     Vision/Hearing  Vision: Impaired  Vision Exceptions: Wears glasses for reading  Hearing: Within functional limits       Subjective  General  Chart Reviewed: Yes  Patient assessed for rehabilitation services?: Yes  Referring Practitioner: Roberto Braun  Referral Date : 19  Diagnosis: altered mental status  Follows Commands: Within Functional Limits  General Comment  Comments: RN cleared pt for therapy eval.  Subjective  Subjective: Patient resting in bed upon therapy arrival.  Patient agreeable to therapy session.   Pain Screening  Patient Currently in Pain: Denies     Orientation  Orientation  Overall Orientation Status: Inpatient CMS G-Code Modifier : [facility] (08/17/19 1206)    Therapy Time   Individual Concurrent Group Co-treatment   Time In 1021         Time Out 1032         Minutes 11         Timed Code Treatment Minutes: 0 Minutes       Pranav Khalil, DPT #[id number]

## 2019-08-17 NOTE — PLAN OF CARE
Problem: Tobacco Use:  Goal: Inpatient tobacco use cessation counseling participation  Description  Inpatient tobacco use cessation counseling participation  Outcome: Ongoing     Pt educated on the importance of smoking cessation and the health benefits of it. Pt given education materials on this and dicussed with pt alternative methods of quitting such as nicotine patches.

## 2019-08-17 NOTE — PROGRESS NOTES
Occupational Therapy   Occupational Therapy Initial Assessment/Discharge Summary  1x only  Date: 2019   Patient Name: Danay Greer  MRN: [id number]     : 1965    Date of Service: 2019    Discharge Recommendations:  Home with assist PRN       Assessment    Patient admitted s/p fall and unresponsiveness. Today patient independent with acute ADLs, mobility and transfers. No further OT needs, will sign off. Prognosis: Good  Decision Making: Low Complexity  OT Education: OT Role;Plan of Care  No Skilled OT: No OT goals identified; At baseline function  REQUIRES OT FOLLOW UP: No  Activity Tolerance  Activity Tolerance: Patient Tolerated treatment well           Patient Diagnosis(es): The encounter diagnosis was Catatonia. has a past medical history of DDD (degenerative disc disease), lumbosacral, Depression, Low back pain, and Ovarian cyst.   has a past surgical history that includes Neck surgery; Tubal ligation; Bony pelvis surgery; LEEP; Breast biopsy; Endometrial ablation; and other surgical history. Restrictions  Restrictions/Precautions  Restrictions/Precautions: General Precautions  Required Braces or Orthoses?: No    Subjective   General  Chart Reviewed: Yes  Patient assessed for rehabilitation services?: Yes  Family / Caregiver Present: No  Referring Practitioner: Latonia Spring MD 19  Diagnosis: Acute Encephalopathy likely Toxic in the setting of Polysubstance abuse   Subjective  Subjective: Pt pleasant and agreeable to OT evaluations.  \"I'm doing a lot better\"  Patient Currently in Pain: Denies  Pain Assessment  Pain Assessment: 0-10  Pain Level: 0  Vital Signs  Patient Currently in Pain: Denies  Social/Functional History  Social/Functional History  Lives With: Spouse(fiance)  Type of Home: (condo)  Home Layout: Multi-level(stays in basement)  Home Access: Stairs to enter with rails  Entrance Stairs - Number of Steps: 1 threshold step + 5 stairs to

## 2019-08-19 NOTE — RESULT ENCOUNTER NOTE
Inform patient that her Lamictal level is subtherapeutic, and she should discuss with her prescribing doctor, level is 1.3.

## 2020-10-02 ENCOUNTER — HOSPITAL ENCOUNTER (EMERGENCY)
Age: 55
Discharge: ANOTHER HOSPITAL | End: 2020-10-03
Attending: EMERGENCY MEDICINE
Payer: MEDICARE

## 2020-10-02 ENCOUNTER — APPOINTMENT (OUTPATIENT)
Dept: GENERAL RADIOLOGY | Age: 55
End: 2020-10-02
Payer: MEDICARE

## 2020-10-02 LAB
A/G RATIO: 1.6 (ref 1.1–2.2)
ACETAMINOPHEN LEVEL: <5 UG/ML (ref 10–30)
ALBUMIN SERPL-MCNC: 4.4 G/DL (ref 3.4–5)
ALP BLD-CCNC: 94 U/L (ref 40–129)
ALT SERPL-CCNC: 48 U/L (ref 10–40)
ANION GAP SERPL CALCULATED.3IONS-SCNC: 14 MMOL/L (ref 3–16)
AST SERPL-CCNC: 44 U/L (ref 15–37)
BASOPHILS ABSOLUTE: 0.1 K/UL (ref 0–0.2)
BASOPHILS RELATIVE PERCENT: 0.8 %
BILIRUB SERPL-MCNC: 0.8 MG/DL (ref 0–1)
BUN BLDV-MCNC: 13 MG/DL (ref 7–20)
CALCIUM SERPL-MCNC: 9.8 MG/DL (ref 8.3–10.6)
CHLORIDE BLD-SCNC: 100 MMOL/L (ref 99–110)
CO2: 22 MMOL/L (ref 21–32)
CREAT SERPL-MCNC: 1.1 MG/DL (ref 0.6–1.1)
EOSINOPHILS ABSOLUTE: 0.1 K/UL (ref 0–0.6)
EOSINOPHILS RELATIVE PERCENT: 0.7 %
ETHANOL: NORMAL MG/DL (ref 0–0.08)
GFR AFRICAN AMERICAN: >60
GFR NON-AFRICAN AMERICAN: 52
GLOBULIN: 2.8 G/DL
GLUCOSE BLD-MCNC: 108 MG/DL (ref 70–99)
HCT VFR BLD CALC: 43.3 % (ref 36–48)
HEMOGLOBIN: 14.5 G/DL (ref 12–16)
LYMPHOCYTES ABSOLUTE: 1.4 K/UL (ref 1–5.1)
LYMPHOCYTES RELATIVE PERCENT: 16.7 %
MCH RBC QN AUTO: 29.6 PG (ref 26–34)
MCHC RBC AUTO-ENTMCNC: 33.5 G/DL (ref 31–36)
MCV RBC AUTO: 88.4 FL (ref 80–100)
MONOCYTES ABSOLUTE: 1.3 K/UL (ref 0–1.3)
MONOCYTES RELATIVE PERCENT: 15.7 %
NEUTROPHILS ABSOLUTE: 5.6 K/UL (ref 1.7–7.7)
NEUTROPHILS RELATIVE PERCENT: 66.1 %
PDW BLD-RTO: 12.9 % (ref 12.4–15.4)
PLATELET # BLD: 275 K/UL (ref 135–450)
PMV BLD AUTO: 8.3 FL (ref 5–10.5)
POTASSIUM REFLEX MAGNESIUM: 3.7 MMOL/L (ref 3.5–5.1)
RBC # BLD: 4.9 M/UL (ref 4–5.2)
SALICYLATE, SERUM: <0.3 MG/DL (ref 15–30)
SARS-COV-2, NAAT: NOT DETECTED
SODIUM BLD-SCNC: 136 MMOL/L (ref 136–145)
TOTAL PROTEIN: 7.2 G/DL (ref 6.4–8.2)
WBC # BLD: 8.4 K/UL (ref 4–11)

## 2020-10-02 PROCEDURE — 85025 COMPLETE CBC W/AUTO DIFF WBC: CPT

## 2020-10-02 PROCEDURE — U0002 COVID-19 LAB TEST NON-CDC: HCPCS

## 2020-10-02 PROCEDURE — G0480 DRUG TEST DEF 1-7 CLASSES: HCPCS

## 2020-10-02 PROCEDURE — 96372 THER/PROPH/DIAG INJ SC/IM: CPT

## 2020-10-02 PROCEDURE — 99284 EMERGENCY DEPT VISIT MOD MDM: CPT

## 2020-10-02 PROCEDURE — 93005 ELECTROCARDIOGRAM TRACING: CPT | Performed by: EMERGENCY MEDICINE

## 2020-10-02 PROCEDURE — 6360000002 HC RX W HCPCS: Performed by: PHYSICIAN ASSISTANT

## 2020-10-02 PROCEDURE — 80053 COMPREHEN METABOLIC PANEL: CPT

## 2020-10-02 PROCEDURE — 72100 X-RAY EXAM L-S SPINE 2/3 VWS: CPT

## 2020-10-02 RX ORDER — LORAZEPAM 2 MG/ML
2 INJECTION INTRAMUSCULAR ONCE
Status: COMPLETED | OUTPATIENT
Start: 2020-10-02 | End: 2020-10-02

## 2020-10-02 RX ORDER — DIPHENHYDRAMINE HYDROCHLORIDE 50 MG/ML
50 INJECTION INTRAMUSCULAR; INTRAVENOUS ONCE
Status: COMPLETED | OUTPATIENT
Start: 2020-10-02 | End: 2020-10-02

## 2020-10-02 RX ORDER — LORAZEPAM 2 MG/ML
INJECTION INTRAMUSCULAR
Status: DISCONTINUED
Start: 2020-10-02 | End: 2020-10-03 | Stop reason: HOSPADM

## 2020-10-02 RX ORDER — DIPHENHYDRAMINE HYDROCHLORIDE 50 MG/ML
INJECTION INTRAMUSCULAR; INTRAVENOUS
Status: DISCONTINUED
Start: 2020-10-02 | End: 2020-10-03 | Stop reason: HOSPADM

## 2020-10-02 RX ORDER — HALOPERIDOL 5 MG/ML
INJECTION INTRAMUSCULAR
Status: DISCONTINUED
Start: 2020-10-02 | End: 2020-10-03 | Stop reason: HOSPADM

## 2020-10-02 RX ORDER — HALOPERIDOL 5 MG/ML
5 INJECTION INTRAMUSCULAR ONCE
Status: COMPLETED | OUTPATIENT
Start: 2020-10-02 | End: 2020-10-02

## 2020-10-02 RX ADMIN — HALOPERIDOL LACTATE 5 MG: 5 INJECTION INTRAMUSCULAR at 23:08

## 2020-10-02 RX ADMIN — DIPHENHYDRAMINE HYDROCHLORIDE 50 MG: 50 INJECTION, SOLUTION INTRAMUSCULAR; INTRAVENOUS at 23:08

## 2020-10-02 RX ADMIN — LORAZEPAM 2 MG: 2 INJECTION, SOLUTION INTRAMUSCULAR; INTRAVENOUS at 23:08

## 2020-10-02 ASSESSMENT — ENCOUNTER SYMPTOMS
SHORTNESS OF BREATH: 0
VOMITING: 0
NAUSEA: 0
ABDOMINAL PAIN: 0

## 2020-10-03 VITALS
BODY MASS INDEX: 25.92 KG/M2 | DIASTOLIC BLOOD PRESSURE: 65 MMHG | RESPIRATION RATE: 18 BRPM | TEMPERATURE: 97.8 F | SYSTOLIC BLOOD PRESSURE: 90 MMHG | HEIGHT: 69 IN | HEART RATE: 91 BPM | OXYGEN SATURATION: 100 % | WEIGHT: 175 LBS

## 2020-10-03 LAB
AMPHETAMINE SCREEN, URINE: POSITIVE
BACTERIA: ABNORMAL /HPF
BARBITURATE SCREEN URINE: ABNORMAL
BENZODIAZEPINE SCREEN, URINE: ABNORMAL
BILIRUBIN URINE: NEGATIVE
BLOOD, URINE: ABNORMAL
CANNABINOID SCREEN URINE: ABNORMAL
CLARITY: CLEAR
COCAINE METABOLITE SCREEN URINE: ABNORMAL
COLOR: YELLOW
EKG ATRIAL RATE: 88 BPM
EKG DIAGNOSIS: NORMAL
EKG P AXIS: 61 DEGREES
EKG P-R INTERVAL: 134 MS
EKG Q-T INTERVAL: 428 MS
EKG QRS DURATION: 122 MS
EKG QTC CALCULATION (BAZETT): 517 MS
EKG R AXIS: -3 DEGREES
EKG T AXIS: 92 DEGREES
EKG VENTRICULAR RATE: 88 BPM
EPITHELIAL CELLS, UA: 0 /HPF (ref 0–5)
GLUCOSE URINE: NEGATIVE MG/DL
HYALINE CASTS: 0 /LPF (ref 0–8)
KETONES, URINE: NEGATIVE MG/DL
LEUKOCYTE ESTERASE, URINE: ABNORMAL
Lab: ABNORMAL
METHADONE SCREEN, URINE: ABNORMAL
MICROSCOPIC EXAMINATION: YES
NITRITE, URINE: POSITIVE
OPIATE SCREEN URINE: ABNORMAL
OXYCODONE URINE: ABNORMAL
PH UA: 6
PH UA: 6 (ref 5–8)
PHENCYCLIDINE SCREEN URINE: ABNORMAL
PROPOXYPHENE SCREEN: ABNORMAL
PROTEIN UA: NEGATIVE MG/DL
RBC UA: 0 /HPF (ref 0–4)
SPECIFIC GRAVITY UA: 1.01 (ref 1–1.03)
URINE REFLEX TO CULTURE: ABNORMAL
URINE TYPE: ABNORMAL
UROBILINOGEN, URINE: 0.2 E.U./DL
WBC UA: 3 /HPF (ref 0–5)

## 2020-10-03 PROCEDURE — 6370000000 HC RX 637 (ALT 250 FOR IP): Performed by: EMERGENCY MEDICINE

## 2020-10-03 PROCEDURE — 80307 DRUG TEST PRSMV CHEM ANLYZR: CPT

## 2020-10-03 PROCEDURE — 81001 URINALYSIS AUTO W/SCOPE: CPT

## 2020-10-03 PROCEDURE — 93010 ELECTROCARDIOGRAM REPORT: CPT | Performed by: INTERNAL MEDICINE

## 2020-10-03 RX ORDER — NITROFURANTOIN 25; 75 MG/1; MG/1
100 CAPSULE ORAL ONCE
Status: COMPLETED | OUTPATIENT
Start: 2020-10-03 | End: 2020-10-03

## 2020-10-03 RX ADMIN — NITROFURANTOIN MONOHYDRATE/MACROCRYSTALLINE 100 MG: 25; 75 CAPSULE ORAL at 01:41

## 2020-10-03 NOTE — ED PROVIDER NOTES
[facility]        Pt Name: Ayesha Francis  MRN: [id number]  [place] 1965  Date of evaluation: 10/2/2020  Provider: Blanco Pruitt PA-C  PCP: Robert Mc MD     I have seen and evaluated this patient with my supervising physician Madison Valenzuela, [address] [place] [address]       Chief Complaint   Patient presents with    Psychiatric Evaluation     Pt presents to the ED by [place] [employer], pt attempting to break into cars and jump out in front of moving vehicles, Pt has extenisve psych HX. Per PD but is hallucinating, stating that the devil is after her. Pt told this RN she is afriad to go home and does not feel safe. Pt placed on hold by PD at [place] [address]   (Location, Timing/Onset, Context/Setting, Quality, Duration, Modifying Factors, Severity, Associated Signs and Symptoms)  Note limiting factors. Ayesha Francis is a 48 y.o. female patient presents emergency department for evaluation of dangerous behavior. Patient states that she went to the police department earlier today as she was afraid to get home. Patient states she is currently living with the devil. Patient states that she has a history of bipolar and that she is taking her medications but does not want to tell me what her medications are until her  arrives. I asked the patient why she had gone to the police station and she states she would not tell me until her  arrives. Per the police officers who are here and had signed a 72-hour hold on this patient, they received multiple phone calls from various citizens stating that she was trying to break into cars and yelling at people. They state that she was running in and out of traffic. They did try and detain the patient however she ran away and when they found her again she told them she was also living with the devil and seeing the devil.   They have seen this patient before and Social History     Tobacco Use    Smoking status: Current Every Day Smoker     Packs/day: 1.00     Types: Cigarettes    Smokeless tobacco: Never Used   Substance Use Topics    Alcohol use: Yes     Alcohol/week: 0.0 standard drinks     Comment: OCCASIONALLY    Drug use: Yes     Types: Methamphetamines       SCREENINGS             PHYSICAL EXAM    (up to 7 for level 4, 8 or more for level 5)     ED Triage Vitals   BP Temp Temp src Pulse Resp SpO2 Height Weight   -- -- -- -- -- -- -- --       Physical Exam  Vitals signs and nursing note reviewed. Constitutional:       General: She is not in acute distress. Appearance: Normal appearance. She is well-developed. She is not ill-appearing, toxic-appearing or diaphoretic. HENT:      Head: Normocephalic and atraumatic. Nose: Nose normal.      Mouth/Throat:      Mouth: Mucous membranes are moist.      Pharynx: Oropharynx is clear. Eyes:      General:         Right eye: No discharge. Left eye: No discharge. Conjunctiva/sclera: Conjunctivae normal.      Pupils: Pupils are equal, round, and reactive to light. Neck:      Musculoskeletal: Normal range of motion and neck supple. Cardiovascular:      Rate and Rhythm: Normal rate and regular rhythm. Pulmonary:      Effort: Pulmonary effort is normal.      Breath sounds: Normal breath sounds. Musculoskeletal: Normal range of motion. Skin:     General: Skin is warm and dry. Capillary Refill: Capillary refill takes less than 2 seconds. Coloration: Skin is not pale. Neurological:      General: No focal deficit present. Mental Status: She is alert and oriented to person, place, and time. Psychiatric:         Attention and Perception: She perceives visual hallucinations. Mood and Affect: Mood normal.         Behavior: Behavior is uncooperative. Thought Content:  Thought content is paranoid and delusional.         DIAGNOSTIC RESULTS   LABS:    Labs Reviewed COMPREHENSIVE METABOLIC PANEL W/ REFLEX TO MG FOR LOW K - Abnormal; Notable for the following components:       Result Value    Glucose 108 (*)     GFR Non- 52 (*)     ALT 48 (*)     AST 44 (*)     All other components within normal limits    Narrative:     Performed at:  [facility] [address], [place]   Phone [phone]   URINE RT REFLEX TO CULTURE - Abnormal; Notable for the following components:    Blood, Urine TRACE (*)     Nitrite, Urine POSITIVE (*)     Leukocyte Esterase, Urine TRACE (*)     All other components within normal limits    Narrative:     Performed at:  [facility] [address], [place]   Phone [phone]   [address] - Abnormal; Notable for the following components:    Amphetamine Screen, Urine POSITIVE (*)     All other components within normal limits    Narrative:     Performed at:  [facility] [address], [place]   Phone [phone]   SALICYLATE LEVEL - Abnormal; Notable for the following components:    Salicylate, Serum <5.9 (*)     All other components within normal limits    Narrative:     Performed at:  [facility] [address], [place]   Phone [phone]   ACETAMINOPHEN LEVEL - Abnormal; Notable for the following components:    Acetaminophen Level <5 (*)     All other components within normal limits    Narrative:     Performed at:  [facility] [address], [place]   Phone [phone]   MICROSCOPIC URINALYSIS - Abnormal; Notable for the following components:    Bacteria, UA 3+ (*)     All other components within normal limits    Narrative:     Performed at:  [facility] [address], [place]   Phone [phone]   [facility] Narrative:     Performed at:  [facility]  [address],  [facility], [address]   Phone [phone]   ETHANOL    Narrative:     Performed at:  [facility]  [address],  [facility], [address]   Phone [phone]    Narrative:     Performed at:  [facility]  [address],  [facility], [address]   Phone [phone]       All other labs were within normal range or not returned as of this dictation. EKG: All EKG's are interpreted by the Emergency Department Physician in the absence of a cardiologist.  Please see their note for interpretation of EKG. RADIOLOGY:   Non-plain film images such as CT, Ultrasound and MRI are read by the radiologist. Plain radiographic images are visualized and preliminarily interpreted by the ED Provider with the below findings:        Interpretation per the Radiologist below, if available at the time of this note:    XR LUMBAR SPINE (2-3 VIEWS)   Preliminary Result   Multilevel degenerative changes without acute osseous abnormality or   spondylolisthesis. No results found.         PROCEDURES   Unless otherwise noted below, none     Procedures    CRITICAL CARE TIME   N/A    CONSULTS:  None      EMERGENCY DEPARTMENT COURSE and DIFFERENTIAL DIAGNOSIS/MDM:   Vitals:    Vitals:    10/02/20 2305 10/03/20 0105 10/03/20 0106 10/03/20 0111   BP: (!) 121/92 122/83  122/83   Pulse: 101   90   Resp: 18   18   Temp: 97.8 °F (36.6 °C)      TempSrc: Oral      SpO2: 100% 98% 100% 100%   Weight: 175 lb (79.4 kg)      Height: 5' 9\" (1.753 m)          Patient was given the following medications:  Medications   LORazepam (ATIVAN) 2 MG/ML injection (has no administration in time range)   diphenhydrAMINE (BENADRYL) 50 MG/ML injection (has no administration in time range)   haloperidol lactate (HALDOL) 5 MG/ML injection (has no administration in time range)   diphenhydrAMINE (BENADRYL) injection 50 mg (50 mg Intramuscular Given 10/2/20 2308)   haloperidol lactate (HALDOL) injection 5 mg (5 mg Intramuscular Given 10/2/20 2308)   LORazepam (ATIVAN) injection 2 mg (2 mg Intramuscular Given 10/2/20 2308)   nitrofurantoin (macrocrystal-monohydrate) (MACROBID) capsule 100 mg (100 mg Oral Given 10/3/20 0141)         Patient presents emergency department for evaluation of reckless behavior. Patient was brought in by [facility] for running amongst traffic and attempting to break into cars. Patient has extensive psychiatric history including bipolar and psychosis. Patient states that she lives with the devil. She is afraid of him. She states she lives in her house. Patient was placed on a psychiatric hold by the police. Patient gives me limited history as she states that her  is coming and does not want to speak about anything until he arrives. Police did make me aware that the  is not a real person and would not be coming to the emergency department. Patient appears generally well. She does not appear unkept and appears clean. She is dressed normally for the weather. She would not allow any evaluation and was uncooperative with staff. Patient was given Benadryl, Haldol, Ativan in the emergency department. Patient then consented to blood draw and will be admitted to psychiatric service for further stabilization. Patient has nitrate positive result on urinalysis. She will be treated for this here in the emergency department. Patient's drug screen is positive for methamphetamine. Work-up unremarkable otherwise. COVID negative. Patient is medically cleared for psychiatric evaluation. FINAL IMPRESSION      1. Agitation    2. Self-injurious behavior    3. Urinary tract infection in female          DISPOSITION/PLAN   DISPOSITION Decision To Transfer 10/03/2020 01:41:50 AM      PATIENT REFERREDTO:  No follow-up provider specified.     DISCHARGE MEDICATIONS:  New Prescriptions    No medications on file       DISCONTINUED MEDICATIONS:  Discontinued Medications    No medications on file              (Please note that portions of this note were completed with a voice recognition program.  Efforts were made to edit the dictations but occasionally words are mis-transcribed.)    Katia Carter PA-C (electronically signed)            Katia Carter PA-C  10/03/20 6000

## 2020-10-03 NOTE — ED NOTES
Pt sleeping soundly at this time. Pt is arrousable, no needs or wants expressed at this time. Safety company sitting at bedside. No symptoms of distress noted.       Shantell Jaimes RN  10/03/20 4257

## 2020-10-03 NOTE — ED NOTES
Pt told this RN they were not able to give a urine sample because they felt like they did not need to go however they did approve for this RN do a straight cath procedure to obtain a urine sample. Pt tolerated procedure well. Urine was obtained and sent to lab. Pt denied any UTI like symptoms, no blood noted, urine was yellow and maloderous. Rena care provided. Pt repositioned in bed and lights out per request.  at bedside. VSS. No symptoms of distress noted. Pt resting eyes at this time.       Carla Ordaz RN  10/03/20 0115

## 2020-10-03 NOTE — ED NOTES
Warm blankets and pt repositioned for comfort. Pt aware of the plan of care.       Shawn Woody RN  10/03/20 0127

## 2020-10-03 NOTE — ED NOTES
Pt sleeping at this time.  at bedside. No needs or wants expressed at this time. No symptoms of distress noted.       Jae Corral RN  10/03/20 5161

## 2020-10-03 NOTE — ED PROVIDER NOTES
I independently performed a history and physical on Tobias Robbins. All diagnostic, treatment, and disposition decisions were made by myself in conjunction with the advanced practice provider. Briefly, this is a 48 y.o. female here for abnormal behavior and agitation. Further history is obtained from police, state the patient was throwing herself in front of cars in the road and appeared to be attempting to injure herself. Patient has history of methamphetamine abuse, states last used 2 days ago. She denies to me any suicidal ideation, however is very evasive with her history, refers most questions to \"you have to ask my . \"    On exam, Patient afebrile and nontoxic. No distress. Heart RRR. Lungs CTAB. Abdomen soft, nondistended, nontender to palpation in all quadrants. A&Ox4. CN 2-12 intact. 5/5 motor and sensation grossly intact all extremities. No pronator drift. Normal finger to nose, normal heel to shin. Downward Babinskis. Normal gait. EKG  EKG was reviewed by emergency department physician in the absence of a cardiologist    Wide complex sinus rhythm, rate 88, normal axis, normal MD and wide QRS intervals, normal Qtc, no ST elevations or depressions, TWI aVL, impression sinus rhythm with left bundle branch block and nonspecific T wave morphology, no STEMI, no significant change from comparison 8/15/2019      Screenings            MDM    Patient afebrile nontoxic. No distress. Neuro exam is nonfocal, nothing to suggest CVA/TIA. Patient is fully alert and oriented. Tox work-up positive for amphetamines, otherwise unremarkable. Clinically patient does not appear intoxicated. Per police report patient had self-injurious behavior, was intentionally throwing herself in front of cars and what appeared to be an attempt to harm her self. Patient does deny suicidal ideation to me at this time, however I am concerned that she is evasive with her history and may not be forthcoming.   Would recommend psychiatric evaluation prior to being discharged to self-care. Patient is medically cleared for psychiatric evaluation. Patient Referrals:  No follow-up provider specified. Discharge Medications:  New Prescriptions    No medications on file       FINAL IMPRESSION  1. Agitation    2. Self-injurious behavior        Blood pressure 122/83, pulse 90, temperature 97.8 °F (36.6 °C), temperature source Oral, resp. rate 18, height 5' 9\" (1.753 m), weight 175 lb (79.4 kg), SpO2 100 %, not currently breastfeeding. For further details of Karolina Hodgson's emergency department encounter, please see documentation by advanced practice provider, HEIDY Waite.     Lenard Hdz DO (electronically signed)  Attending Emergency Physician       Lenard Hdz DO  10/03/20 2001 Julia Yeung DO  10/03/20 0144       Daniel Buitrago DO  10/03/20 0201

## 2020-10-03 NOTE — ED NOTES
This RN gave report to Transfer team, All belongings and paperwork given to Tylor Wallace. Pt woken up and updated on the plan of care. VSS. No symptoms of distress noted.       Xavier Rouse RN  10/03/20 6239

## 2020-10-03 NOTE — ED NOTES
[facility]  Accepting: Ilana Leon  Room 203-A  Report [phone]  Fax [place] slip: [phone]     Casi Miguel RN  10/03/20 0260

## 2020-10-03 NOTE — ED NOTES
This RN gave report to receiving facility. This RN spoke with Savita Lane, they are aware of Pt ETA.  Security called to get pt belongings     [facility]  10/03/20 3264

## 2020-10-03 NOTE — ED NOTES
Pt sitting comfortably in bed. Pt following all requests. Pt still insisting that her  come to the hospital to read her her rights. Pt allowed this RN to obtain blood and do all necessary procedures. Pt was taken to the bathroom to collect urine sample however pt refused and filled urine cup with soap and water. Pt ambulated back to room. Denies any dizzy however is stating that she is having some back pain. , [address] [place] [address] April at bedside watching pt. Room made safe, all boards and cords removed from room. Belongings taken by security. Room is safe.  Call light is with      Wilfrido Carvajal RN  10/02/20 5132

## 2020-10-22 ENCOUNTER — APPOINTMENT (OUTPATIENT)
Dept: CT IMAGING | Age: 55
DRG: 896 | End: 2020-10-22
Payer: MEDICARE

## 2020-10-22 ENCOUNTER — HOSPITAL ENCOUNTER (INPATIENT)
Age: 55
LOS: 3 days | Discharge: HOME | DRG: 896 | End: 2020-10-25
Attending: STUDENT IN AN ORGANIZED HEALTH CARE EDUCATION/TRAINING PROGRAM | Admitting: INTERNAL MEDICINE
Payer: MEDICARE

## 2020-10-22 PROBLEM — R79.89 ELEVATED LFTS: Status: ACTIVE | Noted: 2020-10-22

## 2020-10-22 LAB
A/G RATIO: 2 (ref 1.1–2.2)
ACETAMINOPHEN LEVEL: <5 UG/ML (ref 10–30)
ALBUMIN SERPL-MCNC: 4.7 G/DL (ref 3.4–5)
ALP BLD-CCNC: 205 U/L (ref 40–129)
ALT SERPL-CCNC: 1524 U/L (ref 10–40)
AMPHETAMINE SCREEN, URINE: POSITIVE
ANION GAP SERPL CALCULATED.3IONS-SCNC: 12 MMOL/L (ref 3–16)
AST SERPL-CCNC: 689 U/L (ref 15–37)
BACTERIA: ABNORMAL /HPF
BARBITURATE SCREEN URINE: ABNORMAL
BASOPHILS ABSOLUTE: 0 K/UL (ref 0–0.2)
BASOPHILS RELATIVE PERCENT: 0.6 %
BENZODIAZEPINE SCREEN, URINE: ABNORMAL
BILIRUB SERPL-MCNC: 1.9 MG/DL (ref 0–1)
BILIRUBIN URINE: ABNORMAL
BLOOD, URINE: NEGATIVE
BUN BLDV-MCNC: 10 MG/DL (ref 7–20)
CALCIUM SERPL-MCNC: 9.9 MG/DL (ref 8.3–10.6)
CANNABINOID SCREEN URINE: ABNORMAL
CHLORIDE BLD-SCNC: 101 MMOL/L (ref 99–110)
CLARITY: ABNORMAL
CO2: 28 MMOL/L (ref 21–32)
COCAINE METABOLITE SCREEN URINE: ABNORMAL
COLOR: ABNORMAL
CREAT SERPL-MCNC: 1.2 MG/DL (ref 0.6–1.1)
EOSINOPHILS ABSOLUTE: 0.1 K/UL (ref 0–0.6)
EOSINOPHILS RELATIVE PERCENT: 1.3 %
EPITHELIAL CELLS, UA: 7 /HPF (ref 0–5)
ETHANOL: NORMAL MG/DL (ref 0–0.08)
GFR AFRICAN AMERICAN: 57
GFR NON-AFRICAN AMERICAN: 47
GLOBULIN: 2.3 G/DL
GLUCOSE BLD-MCNC: 96 MG/DL (ref 70–99)
GLUCOSE URINE: NEGATIVE MG/DL
HCG QUALITATIVE: NEGATIVE
HCT VFR BLD CALC: 43 % (ref 36–48)
HEMOGLOBIN: 14.7 G/DL (ref 12–16)
HYALINE CASTS: ABNORMAL /LPF (ref 0–2)
KETONES, URINE: 15 MG/DL
LEUKOCYTE ESTERASE, URINE: ABNORMAL
LIPASE: 13 U/L (ref 13–60)
LYMPHOCYTES ABSOLUTE: 1.4 K/UL (ref 1–5.1)
LYMPHOCYTES RELATIVE PERCENT: 25.7 %
Lab: ABNORMAL
MCH RBC QN AUTO: 29.9 PG (ref 26–34)
MCHC RBC AUTO-ENTMCNC: 34.2 G/DL (ref 31–36)
MCV RBC AUTO: 87.5 FL (ref 80–100)
METHADONE SCREEN, URINE: ABNORMAL
MICROSCOPIC EXAMINATION: YES
MONO TEST: NEGATIVE
MONOCYTES ABSOLUTE: 0.9 K/UL (ref 0–1.3)
MONOCYTES RELATIVE PERCENT: 15.5 %
NEUTROPHILS ABSOLUTE: 3.2 K/UL (ref 1.7–7.7)
NEUTROPHILS RELATIVE PERCENT: 56.9 %
NITRITE, URINE: NEGATIVE
OPIATE SCREEN URINE: ABNORMAL
OXYCODONE URINE: ABNORMAL
PDW BLD-RTO: 13.7 % (ref 12.4–15.4)
PH UA: 6
PH UA: 6 (ref 5–8)
PHENCYCLIDINE SCREEN URINE: ABNORMAL
PLATELET # BLD: 276 K/UL (ref 135–450)
PMV BLD AUTO: 8.7 FL (ref 5–10.5)
POTASSIUM SERPL-SCNC: 4.4 MMOL/L (ref 3.5–5.1)
PROPOXYPHENE SCREEN: ABNORMAL
PROTEIN UA: ABNORMAL MG/DL
RBC # BLD: 4.91 M/UL (ref 4–5.2)
RBC UA: 5 /HPF (ref 0–4)
SALICYLATE, SERUM: <0.3 MG/DL (ref 15–30)
SODIUM BLD-SCNC: 141 MMOL/L (ref 136–145)
SPECIFIC GRAVITY UA: 1.02 (ref 1–1.03)
TOTAL PROTEIN: 7 G/DL (ref 6.4–8.2)
URINE REFLEX TO CULTURE: ABNORMAL
URINE TYPE: ABNORMAL
UROBILINOGEN, URINE: 1 E.U./DL
WBC # BLD: 5.6 K/UL (ref 4–11)
WBC UA: 9 /HPF (ref 0–5)

## 2020-10-22 PROCEDURE — G0480 DRUG TEST DEF 1-7 CLASSES: HCPCS

## 2020-10-22 PROCEDURE — 85025 COMPLETE CBC W/AUTO DIFF WBC: CPT

## 2020-10-22 PROCEDURE — 83690 ASSAY OF LIPASE: CPT

## 2020-10-22 PROCEDURE — 93005 ELECTROCARDIOGRAM TRACING: CPT | Performed by: PHYSICIAN ASSISTANT

## 2020-10-22 PROCEDURE — 81001 URINALYSIS AUTO W/SCOPE: CPT

## 2020-10-22 PROCEDURE — 74177 CT ABD & PELVIS W/CONTRAST: CPT

## 2020-10-22 PROCEDURE — 84703 CHORIONIC GONADOTROPIN ASSAY: CPT

## 2020-10-22 PROCEDURE — 80053 COMPREHEN METABOLIC PANEL: CPT

## 2020-10-22 PROCEDURE — 86308 HETEROPHILE ANTIBODY SCREEN: CPT

## 2020-10-22 PROCEDURE — 2580000003 HC RX 258: Performed by: PHYSICIAN ASSISTANT

## 2020-10-22 PROCEDURE — 2580000003 HC RX 258: Performed by: INTERNAL MEDICINE

## 2020-10-22 PROCEDURE — 6360000004 HC RX CONTRAST MEDICATION: Performed by: PHYSICIAN ASSISTANT

## 2020-10-22 PROCEDURE — 1200000000 HC SEMI PRIVATE

## 2020-10-22 PROCEDURE — 80307 DRUG TEST PRSMV CHEM ANLYZR: CPT

## 2020-10-22 PROCEDURE — 99285 EMERGENCY DEPT VISIT HI MDM: CPT

## 2020-10-22 PROCEDURE — 87341 HEP B SURFACE AG NEUTRLZJ IA: CPT

## 2020-10-22 PROCEDURE — 36415 COLL VENOUS BLD VENIPUNCTURE: CPT

## 2020-10-22 PROCEDURE — 80074 ACUTE HEPATITIS PANEL: CPT

## 2020-10-22 RX ORDER — POLYETHYLENE GLYCOL 3350 17 G/17G
17 POWDER, FOR SOLUTION ORAL DAILY PRN
Status: DISCONTINUED | OUTPATIENT
Start: 2020-10-22 | End: 2020-10-25 | Stop reason: HOSPADM

## 2020-10-22 RX ORDER — SODIUM CHLORIDE 0.9 % (FLUSH) 0.9 %
10 SYRINGE (ML) INJECTION EVERY 12 HOURS SCHEDULED
Status: DISCONTINUED | OUTPATIENT
Start: 2020-10-22 | End: 2020-10-25 | Stop reason: HOSPADM

## 2020-10-22 RX ORDER — ACETAMINOPHEN 650 MG/1
650 SUPPOSITORY RECTAL EVERY 6 HOURS PRN
Status: DISCONTINUED | OUTPATIENT
Start: 2020-10-22 | End: 2020-10-23

## 2020-10-22 RX ORDER — ONDANSETRON 2 MG/ML
4 INJECTION INTRAMUSCULAR; INTRAVENOUS EVERY 6 HOURS PRN
Status: DISCONTINUED | OUTPATIENT
Start: 2020-10-22 | End: 2020-10-25 | Stop reason: HOSPADM

## 2020-10-22 RX ORDER — 0.9 % SODIUM CHLORIDE 0.9 %
1000 INTRAVENOUS SOLUTION INTRAVENOUS ONCE
Status: COMPLETED | OUTPATIENT
Start: 2020-10-22 | End: 2020-10-22

## 2020-10-22 RX ORDER — ACETAMINOPHEN 325 MG/1
650 TABLET ORAL EVERY 6 HOURS PRN
Status: DISCONTINUED | OUTPATIENT
Start: 2020-10-22 | End: 2020-10-23

## 2020-10-22 RX ORDER — PROMETHAZINE HYDROCHLORIDE 25 MG/1
12.5 TABLET ORAL EVERY 6 HOURS PRN
Status: DISCONTINUED | OUTPATIENT
Start: 2020-10-22 | End: 2020-10-25 | Stop reason: HOSPADM

## 2020-10-22 RX ORDER — SODIUM CHLORIDE 0.9 % (FLUSH) 0.9 %
10 SYRINGE (ML) INJECTION PRN
Status: DISCONTINUED | OUTPATIENT
Start: 2020-10-22 | End: 2020-10-25 | Stop reason: HOSPADM

## 2020-10-22 RX ADMIN — SODIUM CHLORIDE 1000 ML: 9 INJECTION, SOLUTION INTRAVENOUS at 17:58

## 2020-10-22 RX ADMIN — Medication 10 ML: at 23:04

## 2020-10-22 RX ADMIN — IOPAMIDOL 75 ML: 755 INJECTION, SOLUTION INTRAVENOUS at 18:36

## 2020-10-22 ASSESSMENT — ENCOUNTER SYMPTOMS
SHORTNESS OF BREATH: 0
ABDOMINAL PAIN: 0
COUGH: 0
NAUSEA: 0
RHINORRHEA: 0
VOMITING: 0
DIARRHEA: 0

## 2020-10-22 ASSESSMENT — PAIN SCALES - GENERAL
PAINLEVEL_OUTOF10: 0

## 2020-10-22 NOTE — ED NOTES
This RN sitting at bedside as . PT resting quietly in bed. Nichole at bedside.       Ankit Noland RN  10/22/20 7227

## 2020-10-22 NOTE — ED NOTES
Lab called and asked if lipase could be added on to previous labs,      Aura Calderón, ELINA  10/22/20 1130

## 2020-10-22 NOTE — ED NOTES
A safety risk assessment of the patient environment was conducted and the room was modified for safety. The room is free of all removed equipment, medical supplies, and articles that may pose a threat to the patient or others such as sharp items and needle boxes. Items were removed from unlocked drawers, cabinets are locked when locks are available, extra linens, medical cords, cables, pictures from wall, ect. Are all removed. The patient call light was left in place due to the medical nature of the unit ad the needs of the patient. The patient was place in a room close to the nursing desk. The patient was placed in a hospital gown.      Patient  at bedside. Bed locked and in lowest position with both side rails raised.  Will monitor pt. hourly. PT denies any needs.  Will continue to monitor.      Pt reports \"its good\" when rn asked if she is hearing or seeing anything, pt calm continues to be stoic, calm, starring straight ahead, no communication unless rn asked pt question, no issues w blood draw       Stann Schilder, RN  10/22/20 [place] [id number] RN  10/22/20 8183

## 2020-10-22 NOTE — ED NOTES
Pt does not report hearing voices at this times, reports voices \"have calm down, I have bi-polar\", pt reports she has not taken her rx meds this morning and \"I am a day off\" pt is calm, stoic appearance, denies chest pain, denies shortnesss of breath, denies \"some\" anxiety but has improved anxiety she said \"is about a 6\"    Pt starting refusing labs and urine when rn came into obtain vitals and introduce herself, rn explained reason and pt then was ok with plan, rn at this time does not have orders,     Bed locked, side rails up x2, call light w tech erasto,     A safety risk assessment of the patient environment was conducted and the room was modified for safety. The room is free of all removed equipment, medical supplies, and articles that may pose a threat to the patient or others such as sharp items and needle boxes. Items were removed from unlocked drawers, cabinets are locked when locks are available, extra linens, medical cords, cables, pictures from wall, ect. Are all removed. The patient call light was left in place due to the medical nature of the unit ad the needs of the patient. The patient was place in a room close to the nursing desk. The patient was placed in a hospital gown. Patient  at bedside. Bed locked and in lowest position with both side rails raised. Will monitor pt. hourly. PT denies any needs. Will continue to monitor.      Security removed all of pts belongs , pt did not have a purse or wallet,      Modesto Alegre RN  10/22/20 [place] [address] [place] [address], RN  10/22/20 6197

## 2020-10-22 NOTE — ED NOTES
Provider asked if pt can have a big gulp instead of a bolus     Johann Tinsley, ELINA  10/22/20 7101

## 2020-10-22 NOTE — ED NOTES
A safety risk assessment of the patient environment was conducted and the room was modified for safety. The room is free of all removed equipment, medical supplies, and articles that may pose a threat to the patient or others such as sharp items and needle boxes. Items remain removed from unlocked drawers, cabinets are locked when locks are available, extra linens, medical cords, cables,, ect. Are all removed. The patient call light was left in place due to the medical nature of the unit ad the needs of the patient. The patient was place in a room close to the nursing desk. The patient was placed in a hospital gown.      Patient  at bedside. Bed locked and in lowest position with both side rails raised.  Will monitor pt. hourly. PT denies any needs.  Will continue to Keshav Landry RN  10/22/20 [address] ELINA Reilly  10/22/20 1598

## 2020-10-22 NOTE — ED NOTES
Vital signs obtained. Ambulated patient to bathroom and back to assigned room. Urine sample collected and obtained. Room remains safe and free from hazards per SI protocol. No further complaints at this time.      Fracisco Mckeon  10/22/20 7683
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
## 2020-10-22 NOTE — ED NOTES
Bed: 05  Expected date:   Expected time:   Means of arrival:   Comments:  Hold pt     Luis Carlos Nettles RN  10/22/20 0393

## 2020-10-22 NOTE — ED PROVIDER NOTES
MidLevel Attestation   I havepersonally performed and/or participated in the history, exam and medical decision making and agree with all pertinent clinical information. I have also reviewed and agree with the past medical, family and social historyunless otherwise noted. I have personally performed a face to face diagnostic evaluation onthis patient. I have reviewed the mid-levels findings and agree. In brief, Koko Grey is a 48 y.o. female that presented to the emergency department with suicidal ideations. Ever she denied any suicidal or homicidal thoughts. Stated that was probably secondary to drug intoxication. Exam was unremarkable. Labs were obtained and showed significantly elevated LFTs. She was admitted for further evaluation and treatment. EKG by my preliminary interpretation shows sinus rhythm with rate of 88, normal axis, normal intervals, with no ST changes indicative of ischemia at this time. But right bundle branch block.     I have reviewed and interpreted all of the currently available lab results and diagnostics from this visit:  Results for orders placed or performed during the hospital encounter of 10/22/20   CBC Auto Differential   Result Value Ref Range    WBC 5.6 4.0 - 11.0 K/uL    RBC 4.91 4.00 - 5.20 M/uL    Hemoglobin 14.7 12.0 - 16.0 g/dL    Hematocrit 43.0 36.0 - 48.0 %    MCV 87.5 80.0 - 100.0 fL    MCH 29.9 26.0 - 34.0 pg    MCHC 34.2 31.0 - 36.0 g/dL    RDW 13.7 12.4 - 15.4 %    Platelets 585 576 - 362 K/uL    MPV 8.7 5.0 - 10.5 fL    Neutrophils % 56.9 %    Lymphocytes % 25.7 %    Monocytes % 15.5 %    Eosinophils % 1.3 %    Basophils % 0.6 %    Neutrophils Absolute 3.2 1.7 - 7.7 K/uL    Lymphocytes Absolute 1.4 1.0 - 5.1 K/uL    Monocytes Absolute 0.9 0.0 - 1.3 K/uL    Eosinophils Absolute 0.1 0.0 - 0.6 K/uL    Basophils Absolute 0.0 0.0 - 0.2 K/uL   Comprehensive Metabolic Panel   Result Value Ref Range    Sodium 141 136 - 145 mmol/L    Potassium 4.4 3.5 - 5.1 mmol/L    Chloride 101 99 - 110 mmol/L    CO2 28 21 - 32 mmol/L    Anion Gap 12 3 - 16    Glucose 96 70 - 99 mg/dL    BUN 10 7 - 20 mg/dL    CREATININE 1.2 (H) 0.6 - 1.1 mg/dL    GFR Non- 47 (A) >60    GFR  57 (A) >60    Calcium 9.9 8.3 - 10.6 mg/dL    Total Protein 7.0 6.4 - 8.2 g/dL    Alb 4.7 3.4 - 5.0 g/dL    Albumin/Globulin Ratio 2.0 1.1 - 2.2    Total Bilirubin 1.9 (H) 0.0 - 1.0 mg/dL    Alkaline Phosphatase 205 (H) 40 - 129 U/L    ALT 1,524 (H) 10 - 40 U/L     (H) 15 - 37 U/L    Globulin 2.3 g/dL   Urinalysis Reflex to Culture    Specimen: Urine, clean catch   Result Value Ref Range    Color, UA DK YELLOW Straw/Yellow    Clarity, UA CLOUDY (A) Clear    Glucose, Ur Negative Negative mg/dL    Bilirubin Urine SMALL (A) Negative    Ketones, Urine 15 (A) Negative mg/dL    Specific Gravity, UA 1.017 1.005 - 1.030    Blood, Urine Negative Negative    pH, UA 6.0 5.0 - 8.0    Protein, UA TRACE (A) Negative mg/dL    Urobilinogen, Urine 1.0 <2.0 E.U./dL    Nitrite, Urine Negative Negative    Leukocyte Esterase, Urine SMALL (A) Negative    Microscopic Examination YES     Urine Type NotGiven     Urine Reflex to Culture Not Indicated    Urine Drug Screen   Result Value Ref Range    Amphetamine Screen, Urine POSITIVE (A) Negative <1000ng/mL    Barbiturate Screen, Ur Neg Negative <200 ng/mL    Benzodiazepine Screen, Urine Neg Negative <200 ng/mL    Cannabinoid Scrn, Ur Neg Negative <50 ng/mL    Cocaine Metabolite Screen, Urine Neg Negative <300 ng/mL    Opiate Scrn, Ur Neg Negative <300 ng/mL    PCP Screen, Urine Neg Negative <25 ng/mL    Methadone Screen, Urine Neg Negative <300 ng/mL    Propoxyphene Scrn, Ur Neg Negative <300 ng/mL    Oxycodone Urine Neg Negative <100 ng/ml    pH, UA 6.0     Drug Screen Comment: see below    HCG Qualitative, Serum   Result Value Ref Range    hCG Qual Negative Detects HCG level >10 MIU/mL   Acetaminophen Level   Result Value Ref Range    Acetaminophen Basophils % 2.7 %    Neutrophils Absolute 1.3 (L) 1.7 - 7.7 K/uL    Lymphocytes Absolute 1.3 1.0 - 5.1 K/uL    Monocytes Absolute 0.9 0.0 - 1.3 K/uL    Eosinophils Absolute 0.1 0.0 - 0.6 K/uL    Basophils Absolute 0.1 0.0 - 0.2 K/uL   Protime-INR   Result Value Ref Range    Protime 14.9 (H) 10.0 - 13.2 sec    INR 1.28 (H) 0.86 - 1.14   PHILIPPE   Result Value Ref Range    PHILIPPE Negative Negative   F-actin (smooth muscle) antibody w/ reflex to titer   Result Value Ref Range    F-ACTIN AB IGG 4 0 - 19 Units   Hepatic function panel   Result Value Ref Range    Total Protein 5.2 (L) 6.4 - 8.2 g/dL    Alb 3.2 (L) 3.4 - 5.0 g/dL    Alkaline Phosphatase 168 (H) 40 - 129 U/L    ALT 1,341 (H) 10 - 40 U/L     (H) 15 - 37 U/L    Total Bilirubin 1.5 (H) 0.0 - 1.0 mg/dL    Bilirubin, Direct 0.8 (H) 0.0 - 0.3 mg/dL    Bilirubin, Indirect 0.7 0.0 - 1.0 mg/dL   Basic Metabolic Panel w/ Reflex to MG   Result Value Ref Range    Sodium 140 136 - 145 mmol/L    Potassium reflex Magnesium 3.8 3.5 - 5.1 mmol/L    Chloride 109 99 - 110 mmol/L    CO2 23 21 - 32 mmol/L    Anion Gap 8 3 - 16    Glucose 88 70 - 99 mg/dL    BUN 13 7 - 20 mg/dL    CREATININE 0.8 0.6 - 1.1 mg/dL    GFR Non-African American >60 >60    GFR African American >60 >60    Calcium 8.2 (L) 8.3 - 10.6 mg/dL   CBC   Result Value Ref Range    WBC 3.0 (L) 4.0 - 11.0 K/uL    RBC 4.14 4.00 - 5.20 M/uL    Hemoglobin 12.4 12.0 - 16.0 g/dL    Hematocrit 36.7 36.0 - 48.0 %    MCV 88.7 80.0 - 100.0 fL    MCH 29.8 26.0 - 34.0 pg    MCHC 33.7 31.0 - 36.0 g/dL    RDW 14.1 12.4 - 15.4 %    Platelets 975 903 - 619 K/uL    MPV 8.7 5.0 - 10.5 fL   Protime-INR   Result Value Ref Range    Protime 16.7 (H) 10.0 - 13.2 sec    INR 1.43 (H) 0.86 - 1.14   Ferritin   Result Value Ref Range    Ferritin 484.7 (H) 15.0 - 150.0 ng/mL   Hepatic function panel   Result Value Ref Range    Total Protein 5.3 (L) 6.4 - 8.2 g/dL    Alb 3.3 (L) 3.4 - 5.0 g/dL    Alkaline Phosphatase 225 (H) 40 - 129 U/L ALT 1,189 (H) 10 - 40 U/L     (H) 15 - 37 U/L    Total Bilirubin 1.2 (H) 0.0 - 1.0 mg/dL    Bilirubin, Direct 0.9 (H) 0.0 - 0.3 mg/dL    Bilirubin, Indirect 0.3 0.0 - 1.0 mg/dL   Protime-INR   Result Value Ref Range    Protime 15.2 (H) 10.0 - 13.2 sec    INR 1.31 (H) 0.86 - 1.14   CBC Auto Differential   Result Value Ref Range    WBC 3.3 (L) 4.0 - 11.0 K/uL    RBC 4.27 4.00 - 5.20 M/uL    Hemoglobin 12.9 12.0 - 16.0 g/dL    Hematocrit 38.1 36.0 - 48.0 %    MCV 89.3 80.0 - 100.0 fL    MCH 30.2 26.0 - 34.0 pg    MCHC 33.8 31.0 - 36.0 g/dL    RDW 14.4 12.4 - 15.4 %    Platelets 768 430 - 255 K/uL    MPV 8.5 5.0 - 10.5 fL    Neutrophils % 30.5 %    Lymphocytes % 38.7 %    Monocytes % 23.9 %    Eosinophils % 4.4 %    Basophils % 2.5 %    Neutrophils Absolute 1.0 (L) 1.7 - 7.7 K/uL    Lymphocytes Absolute 1.3 1.0 - 5.1 K/uL    Monocytes Absolute 0.8 0.0 - 1.3 K/uL    Eosinophils Absolute 0.1 0.0 - 0.6 K/uL    Basophils Absolute 0.1 0.0 - 0.2 K/uL   Comprehensive Metabolic Panel   Result Value Ref Range    Sodium 142 136 - 145 mmol/L    Potassium 3.9 3.5 - 5.1 mmol/L    Chloride 110 99 - 110 mmol/L    CO2 24 21 - 32 mmol/L    Anion Gap 8 3 - 16    Glucose 113 (H) 70 - 99 mg/dL    BUN 9 7 - 20 mg/dL    CREATININE 0.8 0.6 - 1.1 mg/dL    GFR Non-African American >60 >60    GFR African American >60 >60    Calcium 8.6 8.3 - 10.6 mg/dL    Albumin/Globulin Ratio 1.7 1.1 - 2.2    Globulin 2.0 g/dL   EKG 12 Lead   Result Value Ref Range    Ventricular Rate 88 BPM    Atrial Rate 88 BPM    P-R Interval 138 ms    QRS Duration 116 ms    Q-T Interval 412 ms    QTc Calculation (Bazett) 498 ms    P Axis 71 degrees    R Axis -13 degrees    T Axis 82 degrees    Diagnosis       Normal sinus rhythmIncomplete LBBBConfirmed by SABAS Segovia MD ([id number]) on 10/24/2020 11:01:45 AM     No results found.     ED Medication Orders (From admission, onward)    Start Ordered     Status Ordering Provider    10/25/20 1418 10/25/20 1416  influenza quadrivalent split vaccine (FLUZONE;FLUARIX;FLULAVAL;AFLURIA) injection 0.5 mL  PRIOR TO DISCHARGE      Last MAR action:  Given - by Jay Jones on 10/25/20 at [address]    10/22/20 1836 10/22/20 1836  iopamidol (ISOVUE-370) 76 % injection 75 mL  IMG ONCE PRN      Last MAR action:  Given - by CANDIDA MCCLAIN on 10/22/20 at [address] [facility]    10/22/20 1745 10/22/20 1736  0.9 % sodium chloride bolus  ONCE      Last MAR action:  Stopped - by JONY DIAMOND on 10/22/20 [facility]          Final Impression      1. Transaminitis    2. Hyperbilirubinemia    3. Elevated LFTs        DISPOSITION Admitted 10/22/2020 07:44:57 PM       This record is transcribed utilizing voice recognition technology. There are inherent limitations in this technology. In addition, there may be limitations in editing of this report. If there are any discrepancies, please contact me directly.     Florian Li MD   10/25/2020         Nsehniitooh Naomie Heath MD  10/25/20 2035

## 2020-10-22 NOTE — ED NOTES
A safety risk assessment of the patient environment was conducted and the room was modified for safety. The room is free of all removed equipment, medical supplies, and articles that may pose a threat to the patient or others such as sharp items and needle boxes. Items were removed from unlocked drawers, cabinets are locked when locks are available, extra linens, medical cords, cables, pictures from wall, ect. Are all removed. The patient call light was left in place due to the medical nature of the unit ad the needs of the patient. The patient was place in a room close to the nursing desk. The patient was placed in a hospital gown.      Patient  at bedside. Bed locked and in lowest position with both side rails raised.  Will monitor pt. hourly. PT denies any needs.  Will continue to monitor.         Pattie Stevens RN  10/22/20 5558

## 2020-10-22 NOTE — ED NOTES
A safety risk assessment of the patient environment was conducted and the room was modified for safety. The room is free of all removed equipment, medical supplies, and articles that may pose a threat to the patient or others such as sharp items and needle boxes. Items remain removed from unlocked drawers, cabinets are locked when locks are available, extra linens, medical cords, cables,, ect. Are all removed. The patient call light was left in place due to the medical nature of the unit ad the needs of the patient. The patient was place in a room close to the nursing desk. The patient was placed in a hospital gown.      Patient  at bedside. Bed locked and in lowest position with both side rails raised.  Will monitor pt. hourly. PT denies any needs.  Will continue to monitor.            Preet Boogie RN  10/22/20 8105

## 2020-10-23 ENCOUNTER — APPOINTMENT (OUTPATIENT)
Dept: ULTRASOUND IMAGING | Age: 55
DRG: 896 | End: 2020-10-23
Payer: MEDICARE

## 2020-10-23 LAB
ALBUMIN SERPL-MCNC: 3.6 G/DL (ref 3.4–5)
ALP BLD-CCNC: 190 U/L (ref 40–129)
ALT SERPL-CCNC: 1369 U/L (ref 10–40)
ANION GAP SERPL CALCULATED.3IONS-SCNC: 7 MMOL/L (ref 3–16)
AST SERPL-CCNC: 667 U/L (ref 15–37)
BASOPHILS ABSOLUTE: 0.1 K/UL (ref 0–0.2)
BASOPHILS RELATIVE PERCENT: 2.7 %
BILIRUB SERPL-MCNC: 1.3 MG/DL (ref 0–1)
BILIRUBIN DIRECT: 0.5 MG/DL (ref 0–0.3)
BILIRUBIN, INDIRECT: 0.8 MG/DL (ref 0–1)
BUN BLDV-MCNC: 13 MG/DL (ref 7–20)
CALCIUM SERPL-MCNC: 8.8 MG/DL (ref 8.3–10.6)
CHLORIDE BLD-SCNC: 107 MMOL/L (ref 99–110)
CO2: 27 MMOL/L (ref 21–32)
CREAT SERPL-MCNC: 0.9 MG/DL (ref 0.6–1.1)
EOSINOPHILS ABSOLUTE: 0.1 K/UL (ref 0–0.6)
EOSINOPHILS RELATIVE PERCENT: 3.9 %
GFR AFRICAN AMERICAN: >60
GFR NON-AFRICAN AMERICAN: >60
GLUCOSE BLD-MCNC: 98 MG/DL (ref 70–99)
HAV IGM SER IA-ACNC: ABNORMAL
HCT VFR BLD CALC: 38.9 % (ref 36–48)
HEMOGLOBIN: 13.1 G/DL (ref 12–16)
HEPATITIS B CORE IGM ANTIBODY: REACTIVE
HEPATITIS B SURFACE ANTIGEN INTERPRETATION: REACTIVE
HEPATITIS C ANTIBODY INTERPRETATION: ABNORMAL
INR BLD: 1.28 (ref 0.86–1.14)
LYMPHOCYTES ABSOLUTE: 1.3 K/UL (ref 1–5.1)
LYMPHOCYTES RELATIVE PERCENT: 34.5 %
MCH RBC QN AUTO: 29.7 PG (ref 26–34)
MCHC RBC AUTO-ENTMCNC: 33.8 G/DL (ref 31–36)
MCV RBC AUTO: 88 FL (ref 80–100)
MONOCYTES ABSOLUTE: 0.9 K/UL (ref 0–1.3)
MONOCYTES RELATIVE PERCENT: 23.9 %
NEUTROPHILS ABSOLUTE: 1.3 K/UL (ref 1.7–7.7)
NEUTROPHILS RELATIVE PERCENT: 35 %
PDW BLD-RTO: 13.9 % (ref 12.4–15.4)
PLATELET # BLD: 257 K/UL (ref 135–450)
PMV BLD AUTO: 8.9 FL (ref 5–10.5)
POTASSIUM SERPL-SCNC: 3.9 MMOL/L (ref 3.5–5.1)
PROTHROMBIN TIME: 14.9 SEC (ref 10–13.2)
RBC # BLD: 4.42 M/UL (ref 4–5.2)
SODIUM BLD-SCNC: 141 MMOL/L (ref 136–145)
TOTAL PROTEIN: 5.7 G/DL (ref 6.4–8.2)
WBC # BLD: 3.7 K/UL (ref 4–11)

## 2020-10-23 PROCEDURE — 2580000003 HC RX 258: Performed by: INTERNAL MEDICINE

## 2020-10-23 PROCEDURE — 6370000000 HC RX 637 (ALT 250 FOR IP): Performed by: INTERNAL MEDICINE

## 2020-10-23 PROCEDURE — 87341 HEP B SURFACE AG NEUTRLZJ IA: CPT

## 2020-10-23 PROCEDURE — 85610 PROTHROMBIN TIME: CPT

## 2020-10-23 PROCEDURE — 94760 N-INVAS EAR/PLS OXIMETRY 1: CPT

## 2020-10-23 PROCEDURE — 86038 ANTINUCLEAR ANTIBODIES: CPT

## 2020-10-23 PROCEDURE — 80076 HEPATIC FUNCTION PANEL: CPT

## 2020-10-23 PROCEDURE — 83516 IMMUNOASSAY NONANTIBODY: CPT

## 2020-10-23 PROCEDURE — 36415 COLL VENOUS BLD VENIPUNCTURE: CPT

## 2020-10-23 PROCEDURE — 76705 ECHO EXAM OF ABDOMEN: CPT

## 2020-10-23 PROCEDURE — 80048 BASIC METABOLIC PNL TOTAL CA: CPT

## 2020-10-23 PROCEDURE — 1200000000 HC SEMI PRIVATE

## 2020-10-23 PROCEDURE — 85025 COMPLETE CBC W/AUTO DIFF WBC: CPT

## 2020-10-23 PROCEDURE — 6360000002 HC RX W HCPCS: Performed by: INTERNAL MEDICINE

## 2020-10-23 RX ORDER — BUTALBITAL, ACETAMINOPHEN AND CAFFEINE 50; 325; 40 MG/1; MG/1; MG/1
1 TABLET ORAL EVERY 6 HOURS PRN
Status: DISCONTINUED | OUTPATIENT
Start: 2020-10-23 | End: 2020-10-25 | Stop reason: HOSPADM

## 2020-10-23 RX ORDER — RISPERIDONE 1 MG/1
1 TABLET, FILM COATED ORAL 2 TIMES DAILY
Status: DISCONTINUED | OUTPATIENT
Start: 2020-10-23 | End: 2020-10-25 | Stop reason: HOSPADM

## 2020-10-23 RX ORDER — ZIPRASIDONE HYDROCHLORIDE 20 MG/1
40 CAPSULE ORAL 2 TIMES DAILY WITH MEALS
Status: DISCONTINUED | OUTPATIENT
Start: 2020-10-23 | End: 2020-10-25 | Stop reason: HOSPADM

## 2020-10-23 RX ORDER — SODIUM CHLORIDE 9 MG/ML
INJECTION, SOLUTION INTRAVENOUS CONTINUOUS
Status: DISCONTINUED | OUTPATIENT
Start: 2020-10-23 | End: 2020-10-24

## 2020-10-23 RX ORDER — HYDROXYZINE HYDROCHLORIDE 25 MG/1
50 TABLET, FILM COATED ORAL 4 TIMES DAILY PRN
Status: DISCONTINUED | OUTPATIENT
Start: 2020-10-23 | End: 2020-10-25 | Stop reason: HOSPADM

## 2020-10-23 RX ORDER — LAMOTRIGINE 25 MG/1
25 TABLET ORAL 2 TIMES DAILY
Status: DISCONTINUED | OUTPATIENT
Start: 2020-10-23 | End: 2020-10-25 | Stop reason: HOSPADM

## 2020-10-23 RX ORDER — 0.9 % SODIUM CHLORIDE 0.9 %
1000 INTRAVENOUS SOLUTION INTRAVENOUS ONCE
Status: DISCONTINUED | OUTPATIENT
Start: 2020-10-23 | End: 2020-10-23

## 2020-10-23 RX ADMIN — Medication 10 ML: at 08:20

## 2020-10-23 RX ADMIN — Medication 10 ML: at 20:48

## 2020-10-23 RX ADMIN — ENOXAPARIN SODIUM 40 MG: 40 INJECTION SUBCUTANEOUS at 08:18

## 2020-10-23 RX ADMIN — SODIUM CHLORIDE: 9 INJECTION, SOLUTION INTRAVENOUS at 13:49

## 2020-10-23 RX ADMIN — LAMOTRIGINE 25 MG: 25 TABLET ORAL at 08:17

## 2020-10-23 RX ADMIN — ZIPRASIDONE HYDROCHLORIDE 40 MG: 20 CAPSULE ORAL at 13:48

## 2020-10-23 RX ADMIN — RISPERIDONE 1 MG: 1 TABLET ORAL at 20:47

## 2020-10-23 RX ADMIN — ZIPRASIDONE HYDROCHLORIDE 40 MG: 20 CAPSULE ORAL at 20:48

## 2020-10-23 RX ADMIN — RISPERIDONE 1 MG: 1 TABLET ORAL at 08:18

## 2020-10-23 RX ADMIN — LAMOTRIGINE 25 MG: 25 TABLET ORAL at 20:47

## 2020-10-23 ASSESSMENT — PAIN SCALES - GENERAL
PAINLEVEL_OUTOF10: 0

## 2020-10-23 NOTE — PROGRESS NOTES
Patients admission and head to toe assessment completed. Vital signs WNL. Bed alarm engaged, call light within reach. Scheduled medications given per MAR. Patient denies any pain at the moment. Patient resting in bed. Will continue to monitor.

## 2020-10-23 NOTE — PROGRESS NOTES
4 Eyes Skin Assessment     The patient is being assess for  Admission    I agree that 2 RN's have performed a thorough Head to Toe Skin Assessment on the patient. ALL assessment sites listed below have been assessed. Areas assessed by both nurses:   [x]   Head, Face, and Ears   [x]   Shoulders, Back, and Chest  [x]   Arms, Elbows, and Hands   [x]   Coccyx, Sacrum, and IschIum  [x]   Legs, Feet, and Heels        Does the Patient have Skin Breakdown?   No         Bo Prevention initiated:  NA   Wound Care Orders initiated:  NA      WO nurse consulted for Pressure Injury (Stage 3,4, Unstageable, DTI, NWPT, and Complex wounds), New and Established Ostomies:  NA      Nurse 1 eSignature: Electronically signed by Karol Angelucci, RN on 10/22/20 at 11:19 PM EDT    **SHARE this note so that the co-signing nurse is able to place an eSignature**    Nurse 2 eSignature: Electronically signed by Aaron Castellon RN on 10/23/20 at 1:46 AM EDT

## 2020-10-23 NOTE — ED NOTES
Clinical admin. To send PCA to 5T. Dr. Chavez Necessary to put in sitter orders.        Antelmo Mcdermott RN  10/22/20 9496

## 2020-10-23 NOTE — ED NOTES
Pt. Resting on stretcher with eyes closed. No signs of distress noted.  at bedside. Will continue to monitor.        Antelmo Mcdermott RN  10/22/20 1532

## 2020-10-23 NOTE — ED NOTES
Received report from [place], [place].  Pt. Resting on stretcher with eyes closed.  at bedside. Will continue to monitor.        Antelmo Mcdermott RN  10/22/20 9719
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
## 2020-10-23 NOTE — ED NOTES
Called report to [facility]. Pt. Alert. Pt. On room air. Pt. Not on tele, nothing infusing.  at bedside. RN asked to take to her charge RN because they have no PCA. She will call back.        Kevin Comer RN  10/22/20 6403

## 2020-10-23 NOTE — PROGRESS NOTES
Hospitalist Progress Note      PCP: Meghan Benito MD    Date of Admission: 10/22/2020    Chief Complaint: Meth overdose/ confusion    Hospital Course:     Sahara Randle is 48 y.o. female with history of bipolar disorder, cocaine and methamphetamine abuse  Se presents to the ER with complaint of hallucinations. She tells me that, for the past several months that she had a difficult relationship with her boyfriend. Today she was smoking meth around 1 PM with her boyfriend. She was hallucinating and got involved into verbal altercation with her boyfriend. Her boyfriend then called 911. She was then brought to the hospital.   She has a history of bipolar disorder however she says she has not been taking her medication today.      In the ED, her liver enzymes were significantly elevated ALT 1524 and . At time of my interview, she was alert oriented and appropriate. She engages in a reasonable conversation, thought and speech process not delayed or tangential.     Subjective: Says she took some meth while at her parents house. Does not remember the events clearly. Denies any drinking alcohol. No prior history of hepatitis. No IV drug use. Denies any suicidal ideation.       Medications:  Reviewed    Infusion Medications    sodium chloride 100 mL/hr at 10/23/20 1349     Scheduled Medications    risperiDONE  1 mg Oral BID    lamoTRIgine  25 mg Oral BID    ziprasidone  40 mg Oral BID WC    sodium chloride flush  10 mL Intravenous 2 times per day    enoxaparin  40 mg Subcutaneous Daily     PRN Meds: hydrOXYzine, butalbital-acetaminophen-caffeine, sodium chloride flush, polyethylene glycol, promethazine **OR** ondansetron      Intake/Output Summary (Last 24 hours) at 10/23/2020 1550  Last data filed at 10/23/2020 0816  Gross per 24 hour   Intake 1120 ml   Output --   Net 1120 ml       Physical Exam Performed:    BP 97/60   Pulse 64   Temp 98.2 °F (36.8 °C) (Oral)   Resp 16   Ht 5' 9.5\" (1.765 m)   Wt 171 lb 6.4 oz (77.7 kg)   LMP 04/20/2015   SpO2 94%   BMI 24.95 kg/m²     General appearance: No apparent distress, appears stated age and cooperative. HEENT: Pupils equal, round, and reactive to light. Conjunctivae/corneas clear. Neck: Supple, with full range of motion. No jugular venous distention. Trachea midline. Respiratory:  Normal respiratory effort. Clear to auscultation, bilaterally without Rales/Wheezes/Rhonchi. Cardiovascular: Regular rate and rhythm with normal S1/S2 without murmurs, rubs or gallops. Abdomen: Soft, non-tender, non-distended with normal bowel sounds. Musculoskeletal: No clubbing, cyanosis or edema bilaterally. Full range of motion without deformity. Skin: Skin color, texture, turgor normal.  No rashes or lesions. Neurologic:  Neurovascularly intact without any focal sensory/motor deficits. Cranial nerves: II-XII intact, grossly non-focal.  Psychiatric: Alert and oriented, thought content appropriate, normal insight  Capillary Refill: Brisk,< 3 seconds   Peripheral Pulses: +2 palpable, equal bilaterally       Labs:   Recent Labs     10/22/20  1654 10/23/20  0633   WBC 5.6 3.7*   HGB 14.7 13.1   HCT 43.0 38.9    257     Recent Labs     10/22/20  1654 10/23/20  0634    141   K 4.4 3.9    107   CO2 28 27   BUN 10 13   CREATININE 1.2* 0.9   CALCIUM 9.9 8.8     Recent Labs     10/22/20  1654 10/23/20  0634   * 667*   ALT 1,524* 1,369*   BILIDIR  --  0.5*   BILITOT 1.9* 1.3*   ALKPHOS 205* 190*     Recent Labs     10/23/20  1109   INR 1.28*     No results for input(s): Elyse Beat in the last 72 hours. Urinalysis:      Lab Results   Component Value Date    NITRU Negative 10/22/2020    WBCUA 9 10/22/2020    BACTERIA RARE 10/22/2020    RBCUA 5 10/22/2020    BLOODU Negative 10/22/2020    SPECGRAV 1.017 10/22/2020    GLUCOSEU Negative 10/22/2020       Radiology:  US GALLBLADDER RUQ   Final Result   3 mm gallbladder polyp. Gallbladder otherwise is unremarkable. CT ABDOMEN PELVIS W IV CONTRAST Additional Contrast? None   Final Result   No acute abnormality identified to explain the patient's elevated liver   enzymes. Moderate amount of stool within the colon. Correlate with any clinical   evidence of constipation. Assessment/Plan:    Active Hospital Problems    Diagnosis    Elevated LFTs [R79.89]    Cocaine abuse ([facility] [F14.10]    Methamphetamine abuse ([facility] [F15.10]    Bipolar 1 disorder ([facility] [F31.9]     Meth intoxication: Mental status changes resolved. Now at baseline. Advised cessation of drug use. DC sitter and monitor. Acute transaminitis: ALT 1500 and  with bili 1.9 and alk phos 205. CT abdomen nonacute. Ultrasound of gallbladder showed small polyp. Acute hepatitis panel came back positive for acute hepatitis B.  GI consulted. Await further work-up. Trend LFTs daily. Check HIV. Start on diet . Depression: We will get pharmacy to verify her home medications.         DVT Prophylaxis: lovenox  Diet: DIET GENERAL;  Code Status: Full Code    PT/OT Eval Status: not needed    Dispo - inpt 1-2 days     Shaista Hughes MD

## 2020-10-23 NOTE — ED NOTES
Pt. Resting on stretcher with eyes closed. No needs or wants at this time.  at bedside. Will continue to monitor.        Rogerio Ramos RN  10/22/20 3680

## 2020-10-24 LAB
ALBUMIN SERPL-MCNC: 3.2 G/DL (ref 3.4–5)
ALP BLD-CCNC: 168 U/L (ref 40–129)
ALT SERPL-CCNC: 1341 U/L (ref 10–40)
ANION GAP SERPL CALCULATED.3IONS-SCNC: 8 MMOL/L (ref 3–16)
ANTI-NUCLEAR ANTIBODY (ANA): NEGATIVE
AST SERPL-CCNC: 757 U/L (ref 15–37)
BILIRUB SERPL-MCNC: 1.5 MG/DL (ref 0–1)
BILIRUBIN DIRECT: 0.8 MG/DL (ref 0–0.3)
BILIRUBIN, INDIRECT: 0.7 MG/DL (ref 0–1)
BUN BLDV-MCNC: 13 MG/DL (ref 7–20)
CALCIUM SERPL-MCNC: 8.2 MG/DL (ref 8.3–10.6)
CHLORIDE BLD-SCNC: 109 MMOL/L (ref 99–110)
CO2: 23 MMOL/L (ref 21–32)
CREAT SERPL-MCNC: 0.8 MG/DL (ref 0.6–1.1)
EKG ATRIAL RATE: 88 BPM
EKG DIAGNOSIS: NORMAL
EKG P AXIS: 71 DEGREES
EKG P-R INTERVAL: 138 MS
EKG Q-T INTERVAL: 412 MS
EKG QRS DURATION: 116 MS
EKG QTC CALCULATION (BAZETT): 498 MS
EKG R AXIS: -13 DEGREES
EKG T AXIS: 82 DEGREES
EKG VENTRICULAR RATE: 88 BPM
FERRITIN: 484.7 NG/ML (ref 15–150)
GFR AFRICAN AMERICAN: >60
GFR NON-AFRICAN AMERICAN: >60
GLUCOSE BLD-MCNC: 88 MG/DL (ref 70–99)
HCT VFR BLD CALC: 36.7 % (ref 36–48)
HEMOGLOBIN: 12.4 G/DL (ref 12–16)
INR BLD: 1.43 (ref 0.86–1.14)
MCH RBC QN AUTO: 29.8 PG (ref 26–34)
MCHC RBC AUTO-ENTMCNC: 33.7 G/DL (ref 31–36)
MCV RBC AUTO: 88.7 FL (ref 80–100)
PDW BLD-RTO: 14.1 % (ref 12.4–15.4)
PLATELET # BLD: 225 K/UL (ref 135–450)
PMV BLD AUTO: 8.7 FL (ref 5–10.5)
POTASSIUM REFLEX MAGNESIUM: 3.8 MMOL/L (ref 3.5–5.1)
PROTHROMBIN TIME: 16.7 SEC (ref 10–13.2)
RBC # BLD: 4.14 M/UL (ref 4–5.2)
SODIUM BLD-SCNC: 140 MMOL/L (ref 136–145)
TOTAL PROTEIN: 5.2 G/DL (ref 6.4–8.2)
WBC # BLD: 3 K/UL (ref 4–11)

## 2020-10-24 PROCEDURE — 85610 PROTHROMBIN TIME: CPT

## 2020-10-24 PROCEDURE — 94760 N-INVAS EAR/PLS OXIMETRY 1: CPT

## 2020-10-24 PROCEDURE — 85027 COMPLETE CBC AUTOMATED: CPT

## 2020-10-24 PROCEDURE — 80048 BASIC METABOLIC PNL TOTAL CA: CPT

## 2020-10-24 PROCEDURE — 2580000003 HC RX 258: Performed by: INTERNAL MEDICINE

## 2020-10-24 PROCEDURE — 93010 ELECTROCARDIOGRAM REPORT: CPT | Performed by: INTERNAL MEDICINE

## 2020-10-24 PROCEDURE — 82728 ASSAY OF FERRITIN: CPT

## 2020-10-24 PROCEDURE — 6370000000 HC RX 637 (ALT 250 FOR IP): Performed by: INTERNAL MEDICINE

## 2020-10-24 PROCEDURE — 6360000002 HC RX W HCPCS: Performed by: INTERNAL MEDICINE

## 2020-10-24 PROCEDURE — 1200000000 HC SEMI PRIVATE

## 2020-10-24 PROCEDURE — 36415 COLL VENOUS BLD VENIPUNCTURE: CPT

## 2020-10-24 PROCEDURE — 87517 HEPATITIS B DNA QUANT: CPT

## 2020-10-24 PROCEDURE — 80076 HEPATIC FUNCTION PANEL: CPT

## 2020-10-24 RX ADMIN — LAMOTRIGINE 25 MG: 25 TABLET ORAL at 08:21

## 2020-10-24 RX ADMIN — SODIUM CHLORIDE: 9 INJECTION, SOLUTION INTRAVENOUS at 00:40

## 2020-10-24 RX ADMIN — RISPERIDONE 1 MG: 1 TABLET ORAL at 20:22

## 2020-10-24 RX ADMIN — HYDROXYZINE HYDROCHLORIDE 50 MG: 25 TABLET, FILM COATED ORAL at 16:38

## 2020-10-24 RX ADMIN — SODIUM CHLORIDE: 9 INJECTION, SOLUTION INTRAVENOUS at 10:40

## 2020-10-24 RX ADMIN — ZIPRASIDONE HYDROCHLORIDE 40 MG: 20 CAPSULE ORAL at 16:37

## 2020-10-24 RX ADMIN — ENOXAPARIN SODIUM 40 MG: 40 INJECTION SUBCUTANEOUS at 08:20

## 2020-10-24 RX ADMIN — LAMOTRIGINE 25 MG: 25 TABLET ORAL at 20:22

## 2020-10-24 RX ADMIN — ZIPRASIDONE HYDROCHLORIDE 40 MG: 20 CAPSULE ORAL at 08:21

## 2020-10-24 RX ADMIN — RISPERIDONE 1 MG: 1 TABLET ORAL at 08:21

## 2020-10-24 RX ADMIN — Medication 10 ML: at 20:23

## 2020-10-24 ASSESSMENT — PAIN SCALES - GENERAL
PAINLEVEL_OUTOF10: 0

## 2020-10-24 NOTE — PROGRESS NOTES
Pt states Atarax effective, restlessness and anxiety resolved. Pt now sitting up in bed eating dinner. Call light within reach. No s/s of distress noted at this time.

## 2020-10-24 NOTE — PROGRESS NOTES
[facility] GI  Gastroenterology Progress Note    Maggi Carlson is a 48 y.o. female patient. Principal Problem:    Elevated LFTs  Active Problems:    Bipolar 1 disorder (HCC)    Methamphetamine abuse ([facility]    Cocaine abuse ([facility]  Resolved Problems:    * No resolved hospital problems. *      SUBJECTIVE:  Feels better, no abd pain, tolerating po. ROS:    Gastrointestinal ROS: see above    Physical    VITALS:  /63   Pulse 66   Temp 97.9 °F (36.6 °C) (Oral)   Resp 15   Ht 5' 9.5\" (1.765 m)   Wt 171 lb 6.4 oz (77.7 kg)   LMP 2015   SpO2 95%   BMI 24.95 kg/m²   TEMPERATURE:  Current - Temp: 97.9 °F (36.6 °C); Max - Temp  Av.9 °F (36.6 °C)  Min: 97.4 °F (36.3 °C)  Max: 98.1 °F (36.7 °C)    NAD  Regular rate   Lungs CTA Bilaterally  Abdomen soft, ND, NT,  Bowel sounds normal.    Data    Data Review:    Recent Labs     10/22/20  1654 10/23/20  0633 10/24/20  0625   WBC 5.6 3.7* 3.0*   HGB 14.7 13.1 12.4   HCT 43.0 38.9 36.7   MCV 87.5 88.0 88.7    257 225     Recent Labs     10/22/20  1654 10/23/20  0634 10/24/20  0625    141 140   K 4.4 3.9 3.8    107 109   CO2 28 27 23   BUN 10 13 13   CREATININE 1.2* 0.9 0.8     Recent Labs     10/22/20  1654 10/23/20  0634 10/24/20  0625   * 667* 757*   ALT 1,524* 1,369* 1,341*   BILIDIR  --  0.5* 0.8*   BILITOT 1.9* 1.3* 1.5*   ALKPHOS 205* 190* 168*     Recent Labs     10/22/20  1654   LIPASE 13.0     Recent Labs     10/23/20  1109 10/24/20  1358   PROTIME 14.9* 16.7*   INR 1.28* 1.43*     No results for input(s): PTT in the last 72 hours. ASSESSMENT / PLAN :    Acute hepatitis B -  normal liver enzymes in 2019. ALT 1,500 and , Bili 1.9, Alk phos 205 at admission. Normal liver on CT. U/s neg cholecystitis. Hep a and hep c negative. Danyell negative.  Alt slight improved but ast/bili/inr slight increased.     Recommendations:   - monitor liver enzymes/ inr  - f/u asma but hep b explains abnl lft  - I discussed hep b etiology, transmission, natural history, treatment with patient; discussed most pts clear hep b on own but she will need outpt f/u with pcp to ensure resolves and doesn't develop chronic hep b  - drug cessation. Pt interested.  Sw consult for rehab programs.          Viet Mata MD  [facility] Gastroenterology and [address]

## 2020-10-24 NOTE — PROGRESS NOTES
A/O x4, up ad thien. Pt BP 70/42 LUE, left side lying. Pt states her baseline is hypotensive 90's/50-60's. Pt repositioned, raised HOB and BP rechecked, 92/59 LUE. Pt denies dizziness with position changes and going from sitting to standing. Instructed pt to inform nurse if dizziness at rest/with any position changes occurs. Pt verbalized understanding. Call light within reach. No s/s of distress noted at this time.

## 2020-10-24 NOTE — PROGRESS NOTES
Pt c/o restlessness and anxiety. Stated tried to nap and relieve without requesting medication. Pt now requesting Atarax.

## 2020-10-25 VITALS
HEART RATE: 64 BPM | BODY MASS INDEX: 24.54 KG/M2 | TEMPERATURE: 98.5 F | HEIGHT: 70 IN | SYSTOLIC BLOOD PRESSURE: 87 MMHG | DIASTOLIC BLOOD PRESSURE: 56 MMHG | WEIGHT: 171.4 LBS | RESPIRATION RATE: 16 BRPM | OXYGEN SATURATION: 94 %

## 2020-10-25 LAB
A/G RATIO: 1.7 (ref 1.1–2.2)
ALBUMIN SERPL-MCNC: 3.3 G/DL (ref 3.4–5)
ALP BLD-CCNC: 225 U/L (ref 40–129)
ALT SERPL-CCNC: 1189 U/L (ref 10–40)
ANION GAP SERPL CALCULATED.3IONS-SCNC: 8 MMOL/L (ref 3–16)
AST SERPL-CCNC: 545 U/L (ref 15–37)
BASOPHILS ABSOLUTE: 0.1 K/UL (ref 0–0.2)
BASOPHILS RELATIVE PERCENT: 2.5 %
BILIRUB SERPL-MCNC: 1.2 MG/DL (ref 0–1)
BILIRUBIN DIRECT: 0.9 MG/DL (ref 0–0.3)
BILIRUBIN, INDIRECT: 0.3 MG/DL (ref 0–1)
BUN BLDV-MCNC: 9 MG/DL (ref 7–20)
CALCIUM SERPL-MCNC: 8.6 MG/DL (ref 8.3–10.6)
CHLORIDE BLD-SCNC: 110 MMOL/L (ref 99–110)
CO2: 24 MMOL/L (ref 21–32)
CREAT SERPL-MCNC: 0.8 MG/DL (ref 0.6–1.1)
EOSINOPHILS ABSOLUTE: 0.1 K/UL (ref 0–0.6)
EOSINOPHILS RELATIVE PERCENT: 4.4 %
F-ACTIN AB IGG: 4 UNITS (ref 0–19)
GFR AFRICAN AMERICAN: >60
GFR NON-AFRICAN AMERICAN: >60
GLOBULIN: 2 G/DL
GLUCOSE BLD-MCNC: 113 MG/DL (ref 70–99)
HCT VFR BLD CALC: 38.1 % (ref 36–48)
HEMOGLOBIN: 12.9 G/DL (ref 12–16)
INR BLD: 1.31 (ref 0.86–1.14)
LYMPHOCYTES ABSOLUTE: 1.3 K/UL (ref 1–5.1)
LYMPHOCYTES RELATIVE PERCENT: 38.7 %
MCH RBC QN AUTO: 30.2 PG (ref 26–34)
MCHC RBC AUTO-ENTMCNC: 33.8 G/DL (ref 31–36)
MCV RBC AUTO: 89.3 FL (ref 80–100)
MONOCYTES ABSOLUTE: 0.8 K/UL (ref 0–1.3)
MONOCYTES RELATIVE PERCENT: 23.9 %
NEUTROPHILS ABSOLUTE: 1 K/UL (ref 1.7–7.7)
NEUTROPHILS RELATIVE PERCENT: 30.5 %
PDW BLD-RTO: 14.4 % (ref 12.4–15.4)
PLATELET # BLD: 231 K/UL (ref 135–450)
PMV BLD AUTO: 8.5 FL (ref 5–10.5)
POTASSIUM SERPL-SCNC: 3.9 MMOL/L (ref 3.5–5.1)
PROTHROMBIN TIME: 15.2 SEC (ref 10–13.2)
RBC # BLD: 4.27 M/UL (ref 4–5.2)
SODIUM BLD-SCNC: 142 MMOL/L (ref 136–145)
TOTAL PROTEIN: 5.3 G/DL (ref 6.4–8.2)
WBC # BLD: 3.3 K/UL (ref 4–11)

## 2020-10-25 PROCEDURE — 85610 PROTHROMBIN TIME: CPT

## 2020-10-25 PROCEDURE — 82248 BILIRUBIN DIRECT: CPT

## 2020-10-25 PROCEDURE — 36415 COLL VENOUS BLD VENIPUNCTURE: CPT

## 2020-10-25 PROCEDURE — 6360000002 HC RX W HCPCS: Performed by: INTERNAL MEDICINE

## 2020-10-25 PROCEDURE — 85025 COMPLETE CBC W/AUTO DIFF WBC: CPT

## 2020-10-25 PROCEDURE — 6370000000 HC RX 637 (ALT 250 FOR IP): Performed by: INTERNAL MEDICINE

## 2020-10-25 PROCEDURE — 90686 IIV4 VACC NO PRSV 0.5 ML IM: CPT | Performed by: INTERNAL MEDICINE

## 2020-10-25 PROCEDURE — 94760 N-INVAS EAR/PLS OXIMETRY 1: CPT

## 2020-10-25 PROCEDURE — 80053 COMPREHEN METABOLIC PANEL: CPT

## 2020-10-25 PROCEDURE — 2580000003 HC RX 258: Performed by: INTERNAL MEDICINE

## 2020-10-25 PROCEDURE — G0008 ADMIN INFLUENZA VIRUS VAC: HCPCS | Performed by: INTERNAL MEDICINE

## 2020-10-25 RX ADMIN — INFLUENZA A VIRUS A/VICTORIA/2454/2019 IVR-207 (H1N1) ANTIGEN (PROPIOLACTONE INACTIVATED), INFLUENZA A VIRUS A/HONG KONG/2671/2019 IVR-208 (H3N2) ANTIGEN (PROPIOLACTONE INACTIVATED), INFLUENZA B VIRUS B/VICTORIA/705/2018 BVR-11 ANTIGEN (PROPIOLACTONE INACTIVATED), INFLUENZA B VIRUS B/PHUKET/3073/2013 BVR-1B ANTIGEN (PROPIOLACTONE INACTIVATED) 0.5 ML: 15; 15; 15; 15 INJECTION, SUSPENSION INTRAMUSCULAR at 14:24

## 2020-10-25 RX ADMIN — ZIPRASIDONE HYDROCHLORIDE 40 MG: 20 CAPSULE ORAL at 08:50

## 2020-10-25 RX ADMIN — ENOXAPARIN SODIUM 40 MG: 40 INJECTION SUBCUTANEOUS at 08:49

## 2020-10-25 RX ADMIN — LAMOTRIGINE 25 MG: 25 TABLET ORAL at 08:49

## 2020-10-25 RX ADMIN — RISPERIDONE 1 MG: 1 TABLET ORAL at 08:49

## 2020-10-25 RX ADMIN — Medication 10 ML: at 08:49

## 2020-10-25 ASSESSMENT — PAIN SCALES - GENERAL
PAINLEVEL_OUTOF10: 0

## 2020-10-25 NOTE — PROGRESS NOTES
Pt discharged. Discussed discharge instructions, reviewed medications with pt and pt verbalized understanding. Pt IV removed, cath/tip intact, pressure bandage applied, pt jeannie well. Pt waiting for [facility] service to  from [facility] and taske home. Pt denies questions or concerns at this time.

## 2020-10-25 NOTE — PROGRESS NOTES
[facility] GI  Gastroenterology Progress Note    Virgen Juan is a 48 y.o. female patient. Principal Problem:    Elevated LFTs  Active Problems:    Bipolar 1 disorder (HCC)    Methamphetamine abuse ([facility]    Cocaine abuse ([facility]  Resolved Problems:    * No resolved hospital problems. *      SUBJECTIVE:  Feels well, no abd pain, tolerating po. ROS:    Gastrointestinal ROS: see above    Physical    VITALS:  BP 95/62   Pulse 64   Temp 98.2 °F (36.8 °C) (Oral)   Resp 16   Ht 5' 9.5\" (1.765 m)   Wt 171 lb 6.4 oz (77.7 kg)   LMP 2015   SpO2 96%   BMI 24.95 kg/m²   TEMPERATURE:  Current - Temp: 98.2 °F (36.8 °C); Max - Temp  Av °F (36.7 °C)  Min: 97.7 °F (36.5 °C)  Max: 98.2 °F (36.8 °C)    NAD  Regular rate   Lungs CTA Bilaterally  Abdomen soft, ND, NT,  Bowel sounds normal.    Data    Data Review:    Recent Labs     10/23/20  0633 10/24/20  0625 10/25/20  0524   WBC 3.7* 3.0* 3.3*   HGB 13.1 12.4 12.9   HCT 38.9 36.7 38.1   MCV 88.0 88.7 89.3    225 231     Recent Labs     10/23/20  0634 10/24/20  0625 10/25/20  0524    140 142   K 3.9 3.8 3.9    109 110   CO2 27 23 24   BUN 13 13 9   CREATININE 0.9 0.8 0.8     Recent Labs     10/23/20  0634 10/24/20  0625 10/25/20  0524   * 757* 545*   ALT 1,369* 1,341* 1,189*   BILIDIR 0.5* 0.8* 0.9*   BILITOT 1.3* 1.5* 1.2*   ALKPHOS 190* 168* 225*     Recent Labs     10/22/20  1654   LIPASE 13.0     Recent Labs     10/23/20  1109 10/24/20  1358 10/25/20  0524   PROTIME 14.9* 16.7* 15.2*   INR 1.28* 1.43* 1.31*     No results for input(s): PTT in the last 72 hours. ASSESSMENT / PLAN :    Acute hepatitis B -  normal liver enzymes in 2019. ALT 1,500 and , Bili 1.9, Alk phos 205 at admission. Normal liver on CT. U/s neg cholecystitis. Hep a and hep c negative. Danyell negative. Alt/ast/bili/inr all improved today.  No clinical symptoms.     Recommendations:   - f/u asma but hep b explains abnl lft  - pt can f/u lft in one week with pcp then follow with pcp to ensure lft normalize and hep b clears; she understands if doesn't clear hep b then will need see hepatology for chronic hep b management. - I discussed hep b etiology, transmission, natural history, treatment with patient yesterday; discussed most pts clear hep b on own but she will need outpt f/u with pcp to ensure resolves and doesn't develop chronic hep b  - drug cessation. Pt interested. Sw consult for rehab programs. Otherwise will sign off, call if needed.     Christianne Shannon MD  [facility] Gastroenterology and [address]

## 2020-10-25 NOTE — DISCHARGE SUMMARY
Patient: Maggi Carlson     Gender: female  : 1965   Age: 48 y.o. MRN: [id number]    Admitting Physician: Tianna Elena MD  Discharge Physician: Seng Nur MD     Code Status: Full Code     Admit Date: 10/22/2020   Discharge Date:  10/25/2020    Disposition:  Home    Discharge Diagnoses:  Acute hepatitis secondary to acute hepatitis B    Active Hospital Problems    Diagnosis Date Noted    Elevated LFTs [R79.89] 10/22/2020    Cocaine abuse ([facility] [F14.10] 2019    Methamphetamine abuse ([facility] [F15.10] 2019    Bipolar 1 disorder ([facility] [F31.9] 08/15/2019       Follow-up appointments:  one week    Outpatient to do list: With LFTs in 1 week time    Condition at Discharge:  [address] Course:   43-year-old lady admitted to hospital with altered mental status     Acute toxic encephalopathy secondary to suspected meth intoxication  Currently awake alert oriented     Polysubstance abuse  Patient denies any IV drug use ever only complaint only states that she always inhales or takes it orally     Acute hepatitis suspected secondary to hepatitis B  ALT AST in thousands but slowly improving  INR is normal  CT of the abdomen was negative  Ultrasound of the gallbladder showed a small polyp otherwise negative  Acute hepatitis panel indicates hep B core antibody positive and hep B surface antigen positive  Monitor LFTs  GI consult appreciated  HIV negative  I discussed this with the patient who voiced understanding  Her LFTs slowly improved although ALT is still in thousands and AST is around 500  Her INR remained stable  Her  Per GI recommendations  f/u asma but hep b explains abnl lft  - pt can f/u lft in one week with pcp then follow with pcp to ensure lft normalize and hep b clears; she understands if doesn't clear hep b then will need see hepatology for chronic hep b management.    - I discussed hep b etiology, transmission, natural history, treatment with patient yesterday; discussed most pts clear hep b on own but she will need outpt f/u with pcp to ensure resolves and doesn't develop chronic hep b  - drug cessation. Pt interested. Sw consult for rehab programs.   Depression    Having discussed with gastroenterology given improvement in LFTs and stability and this probably being acute hepatitis B recommended that she was okay to be discharged follow-up LFTs in 1 week and follow-up with them  I emphasized to the patient the need for follow-up and monitoring LFTs she voiced understanding is in agreement      Discharge Medications:   Current Discharge Medication List        Current Discharge Medication List        Current Discharge Medication List      CONTINUE these medications which have NOT CHANGED    Details   hydrOXYzine (VISTARIL) 50 MG capsule Take 50 mg by mouth 4 times daily as needed for Anxiety      risperiDONE (RISPERDAL) 1 MG tablet Take 1 mg by mouth 2 times daily      lamoTRIgine (LAMICTAL) 25 MG tablet Take 25 mg by mouth 2 times daily           Current Discharge Medication List          Discharge ROS:  A complete review of systems was asked and negative     Discharge Exam:    BP (!) 87/56   Pulse 64   Temp 98.5 °F (36.9 °C) (Oral)   Resp 16   Ht 5' 9.5\" (1.765 m)   Wt 171 lb 6.4 oz (77.7 kg)   LMP 04/20/2015   SpO2 94%   BMI 24.95 kg/m²   General appearance:  NAD  HEENT:   Normal cephalic, atraumatic, moist mucous membranes, no oropharyngeal erythema or exudate  Heart[de-identified] Normal s1/s2, RRR, no murmurs, gallops, or rubs. no leg edema  Lungs:  Normal respiratory effort. Clear to auscultation, bilaterally without Rales/Wheezes/Rhonchi. Abdomen: Soft, non-tender, non-distended, bowel sounds present, no masses  Musculoskeletal:  No clubbing, no cyanosis, *  Neurologic:  Neurovascularly intact without any focal sensory/motor deficits. Cranial nerves: II-XII intact, grossly non-focal.    Labs:  For convenience and continuity at follow-up the following most recent labs are provided:    Lab Results   Component Value Date    WBC 3.3 10/25/2020    HGB 12.9 10/25/2020    HCT 38.1 10/25/2020    MCV 89.3 10/25/2020     10/25/2020     10/25/2020    K 3.9 10/25/2020    K 3.8 10/24/2020     10/25/2020    CO2 24 10/25/2020    BUN 9 10/25/2020    CREATININE 0.8 10/25/2020    CALCIUM 8.6 10/25/2020    ALKPHOS 225 10/25/2020    ALT 1,189 10/25/2020     10/25/2020    BILITOT 1.2 10/25/2020    BILIDIR 0.9 10/25/2020    LABALBU 3.3 10/25/2020     Lab Results   Component Value Date    INR 1.31 (H) 10/25/2020    INR 1.43 (H) 10/24/2020    INR 1.28 (H) 10/23/2020           The patient was seen and examined on day of discharge and this discharge summary is in conjunction with any daily progress note from day of discharge. Time Spent on discharge is 45 minutes  in the examination, evaluation, counseling and review of medications and discharge plan. Note that greater  than 30 minutes was spent in preparing discharge papers, discussing discharge with patient, medication review, etc.       Signed:    Pushpa Mcnally MD   10/25/2020      Thank you Romulo Schmid MD for the opportunity to be involved in this patient's care.  If you have any questions or concerns please feel free to contact me

## 2020-10-25 NOTE — PLAN OF CARE
Problem: Pain:  Goal: Pain level will decrease  Description: Pain level will decrease  Outcome: Ongoing     Problem: Pain:  Goal: Control of acute pain  Description: Control of acute pain  Outcome: Ongoing     Problem: Pain:  Goal: Control of chronic pain  Description: Control of chronic pain  Outcome: Ongoing     Problem: Discharge Planning:  Goal: Discharged to appropriate level of care  Description: Discharged to appropriate level of care  Outcome: Ongoing

## 2020-10-27 LAB — HEPATITIS B SURFACE ANTIGEN CONFIRMATION: POSITIVE

## 2020-10-29 LAB — HEPATITIS B SURFACE ANTIGEN CONFIRMATION: POSITIVE

## 2020-10-30 LAB
HBV QNT LOG, IU/ML: 8.5 LOG IU/ML
HBV QNT, IU/ML: ABNORMAL
INTERPRETATION: DETECTED

## 2021-01-09 ENCOUNTER — HOSPITAL ENCOUNTER (EMERGENCY)
Age: 56
Discharge: NURSING, REHAB OR LONG-TERM CARE | End: 2021-01-10
Attending: EMERGENCY MEDICINE
Payer: MEDICARE

## 2021-01-09 DIAGNOSIS — F15.10 METHAMPHETAMINE ABUSE (HCC): Primary | ICD-10-CM

## 2021-01-09 DIAGNOSIS — F29 PSYCHOSIS, UNSPECIFIED PSYCHOSIS TYPE (HCC): ICD-10-CM

## 2021-01-09 DIAGNOSIS — Z86.19 HISTORY OF HEPATITIS B VIRUS INFECTION: ICD-10-CM

## 2021-01-09 LAB
A/G RATIO: 1.3 (ref 1.1–2.2)
ACETAMINOPHEN LEVEL: <5 UG/ML (ref 10–30)
ALBUMIN SERPL-MCNC: 3.8 G/DL (ref 3.4–5)
ALP BLD-CCNC: 105 U/L (ref 40–129)
ALT SERPL-CCNC: 37 U/L (ref 10–40)
AMPHETAMINE SCREEN, URINE: POSITIVE
ANION GAP SERPL CALCULATED.3IONS-SCNC: 9 MMOL/L (ref 3–16)
AST SERPL-CCNC: 36 U/L (ref 15–37)
BACTERIA: ABNORMAL /HPF
BARBITURATE SCREEN URINE: ABNORMAL
BASOPHILS ABSOLUTE: 0.1 K/UL (ref 0–0.2)
BASOPHILS RELATIVE PERCENT: 1.3 %
BENZODIAZEPINE SCREEN, URINE: ABNORMAL
BILIRUB SERPL-MCNC: 3.3 MG/DL (ref 0–1)
BILIRUBIN URINE: ABNORMAL
BLOOD, URINE: NEGATIVE
BUN BLDV-MCNC: 11 MG/DL (ref 7–20)
CALCIUM SERPL-MCNC: 9.5 MG/DL (ref 8.3–10.6)
CANNABINOID SCREEN URINE: ABNORMAL
CHLORIDE BLD-SCNC: 99 MMOL/L (ref 99–110)
CLARITY: ABNORMAL
CO2: 28 MMOL/L (ref 21–32)
COCAINE METABOLITE SCREEN URINE: ABNORMAL
COLOR: ABNORMAL
CREAT SERPL-MCNC: 0.9 MG/DL (ref 0.6–1.1)
EOSINOPHILS ABSOLUTE: 0.1 K/UL (ref 0–0.6)
EOSINOPHILS RELATIVE PERCENT: 2.4 %
EPITHELIAL CELLS, UA: 18 /HPF (ref 0–5)
ETHANOL: NORMAL MG/DL (ref 0–0.08)
GFR AFRICAN AMERICAN: >60
GFR NON-AFRICAN AMERICAN: >60
GLOBULIN: 2.9 G/DL
GLUCOSE BLD-MCNC: 93 MG/DL (ref 70–99)
GLUCOSE URINE: NEGATIVE MG/DL
HCG QUALITATIVE: NEGATIVE
HCT VFR BLD CALC: 37.9 % (ref 36–48)
HEMOGLOBIN: 12.8 G/DL (ref 12–16)
HYALINE CASTS: 13 /LPF (ref 0–8)
KETONES, URINE: ABNORMAL MG/DL
LEUKOCYTE ESTERASE, URINE: ABNORMAL
LYMPHOCYTES ABSOLUTE: 1.8 K/UL (ref 1–5.1)
LYMPHOCYTES RELATIVE PERCENT: 31.8 %
Lab: ABNORMAL
MCH RBC QN AUTO: 31.4 PG (ref 26–34)
MCHC RBC AUTO-ENTMCNC: 33.8 G/DL (ref 31–36)
MCV RBC AUTO: 92.9 FL (ref 80–100)
METHADONE SCREEN, URINE: ABNORMAL
MICROSCOPIC EXAMINATION: YES
MONOCYTES ABSOLUTE: 0.9 K/UL (ref 0–1.3)
MONOCYTES RELATIVE PERCENT: 16.5 %
NEUTROPHILS ABSOLUTE: 2.7 K/UL (ref 1.7–7.7)
NEUTROPHILS RELATIVE PERCENT: 48 %
NITRITE, URINE: NEGATIVE
OPIATE SCREEN URINE: ABNORMAL
OXYCODONE URINE: ABNORMAL
PDW BLD-RTO: 13.7 % (ref 12.4–15.4)
PH UA: 6
PH UA: 6 (ref 5–8)
PHENCYCLIDINE SCREEN URINE: ABNORMAL
PLATELET # BLD: 211 K/UL (ref 135–450)
PMV BLD AUTO: 8 FL (ref 5–10.5)
POTASSIUM REFLEX MAGNESIUM: 3.8 MMOL/L (ref 3.5–5.1)
PROPOXYPHENE SCREEN: ABNORMAL
PROTEIN UA: NEGATIVE MG/DL
RBC # BLD: 4.08 M/UL (ref 4–5.2)
RBC UA: ABNORMAL /HPF (ref 0–4)
SALICYLATE, SERUM: <0.3 MG/DL (ref 15–30)
SARS-COV-2, NAAT: NOT DETECTED
SODIUM BLD-SCNC: 136 MMOL/L (ref 136–145)
SPECIFIC GRAVITY UA: 1.02 (ref 1–1.03)
TOTAL PROTEIN: 6.7 G/DL (ref 6.4–8.2)
URINE REFLEX TO CULTURE: YES
URINE TYPE: ABNORMAL
UROBILINOGEN, URINE: 1 E.U./DL
WBC # BLD: 5.7 K/UL (ref 4–11)
WBC UA: 18 /HPF (ref 0–5)

## 2021-01-09 PROCEDURE — 84703 CHORIONIC GONADOTROPIN ASSAY: CPT

## 2021-01-09 PROCEDURE — 36415 COLL VENOUS BLD VENIPUNCTURE: CPT

## 2021-01-09 PROCEDURE — 99284 EMERGENCY DEPT VISIT MOD MDM: CPT

## 2021-01-09 PROCEDURE — 81001 URINALYSIS AUTO W/SCOPE: CPT

## 2021-01-09 PROCEDURE — G0480 DRUG TEST DEF 1-7 CLASSES: HCPCS

## 2021-01-09 PROCEDURE — 6360000002 HC RX W HCPCS: Performed by: GENERAL ACUTE CARE HOSPITAL

## 2021-01-09 PROCEDURE — 80307 DRUG TEST PRSMV CHEM ANLYZR: CPT

## 2021-01-09 PROCEDURE — U0002 COVID-19 LAB TEST NON-CDC: HCPCS

## 2021-01-09 PROCEDURE — 87086 URINE CULTURE/COLONY COUNT: CPT

## 2021-01-09 PROCEDURE — 93005 ELECTROCARDIOGRAM TRACING: CPT | Performed by: EMERGENCY MEDICINE

## 2021-01-09 PROCEDURE — 96372 THER/PROPH/DIAG INJ SC/IM: CPT

## 2021-01-09 PROCEDURE — 85025 COMPLETE CBC W/AUTO DIFF WBC: CPT

## 2021-01-09 PROCEDURE — 80053 COMPREHEN METABOLIC PANEL: CPT

## 2021-01-09 RX ORDER — LORAZEPAM 2 MG/ML
1 INJECTION INTRAMUSCULAR ONCE
Status: COMPLETED | OUTPATIENT
Start: 2021-01-09 | End: 2021-01-09

## 2021-01-09 RX ORDER — ZIPRASIDONE MESYLATE 20 MG/ML
20 INJECTION, POWDER, LYOPHILIZED, FOR SOLUTION INTRAMUSCULAR ONCE
Status: COMPLETED | OUTPATIENT
Start: 2021-01-09 | End: 2021-01-09

## 2021-01-09 RX ADMIN — ZIPRASIDONE MESYLATE 20 MG: 20 INJECTION, POWDER, LYOPHILIZED, FOR SOLUTION INTRAMUSCULAR at 20:25

## 2021-01-09 RX ADMIN — LORAZEPAM 1 MG: 2 INJECTION, SOLUTION INTRAMUSCULAR; INTRAVENOUS at 20:25

## 2021-01-09 ASSESSMENT — ENCOUNTER SYMPTOMS
ALLERGIC/IMMUNOLOGIC NEGATIVE: 1
CONSTIPATION: 0
COLOR CHANGE: 1
SORE THROAT: 0
BACK PAIN: 1
SHORTNESS OF BREATH: 0
ABDOMINAL PAIN: 0
CHEST TIGHTNESS: 0
VOMITING: 0

## 2021-01-09 NOTE — ED NOTES
Pt sitting up in bed, no s/s distress noted. Pt is vague when answering this writer's questions. Pt presents with irration while answer this nurse questions. Pt states she has thoughts of hurting herself but not in while, pt denies every making a plan. Pt states she came to er today to get help with her \"mental illness\" but would not go into details with this nurse. Pt refuses to use the restroom to collect urine. Suicide precaution in place.  April Tech at bedside, due to, suicidal ideations. Patient in gown with ties cut off. All belongings at bedside,   [employer] in security collected pt clothing. Board above [facility] and [facility] removed from room for safety. Patients Room is free of all removable equipment and medical supplies and all medical cords/cables removed. Bedside cart locked. Will continue to monitor.              Jono Bauman RN  01/09/21 3338

## 2021-01-09 NOTE — ED NOTES
Bed: 21  Expected date:   Expected time:   Means of arrival: Miguel A [facility]  Comments:     Madhav Askew RN  01/09/21 8883

## 2021-01-09 NOTE — ED PROVIDER NOTES
[address] [facility]        Pt Name: Lily Mueller  MRN: [id number]  [place] 1965  Date of evaluation: 1/9/2021  Provider: KAITLIN Molina - MITA  PCP: Padmini Fernandez MD     I have seen and evaluated this patient with my supervising physician No att. providers found. CHIEF COMPLAINT       Chief Complaint   Patient presents with    Psychiatric Evaluation     Brought in by PD, hx bilpolar no meds right now, denies SI/HI, states feels like some one will hurt her, PD hold, last used meth few days ago, would like help with psych problems       HISTORY OF PRESENT ILLNESS   (Location, Timing/Onset, Context/Setting, Quality, Duration, Modifying Factors, Severity, Associated Signs and Symptoms)  Note limiting factors. Lily Mueller is a 48 y.o. female thence to the emergency department today on police hold for psychiatric evaluation. Patient with history of bipolar disorder. She admits that she has not taken any medications \"in a while\"  Patient admits to methamphetamine abuse most recently 3 days ago. [facility]. Patient admits to both visual and auditory hallucinations. Patient has been found wandering outside wearing minimal clothing and stating that she is loss. Patient also with history of heroin abuse. She states that she was diagnosed with hepatitis B just a couple months ago. She denies suicidal or homicidal thoughts however she states that she is concerned for her safety. She states that she sometimes feels as if people are following her. She denies any nausea, vomiting or diarrhea. There has been no recent travel or known sick contacts. She denies fever, chills, or other symptoms. Nursing Notes were all reviewed and agreed with or any disagreements were addressed in the HPI.     REVIEW OF SYSTEMS    (2-9 systems for level 4, 10 or more for level 5) Review of Systems   Constitutional: Negative for chills and fever. HENT: Negative for congestion and sore throat. Eyes:        Scleral icterus   Respiratory: Negative for chest tightness and shortness of breath. Cardiovascular: Negative for chest pain and palpitations. Gastrointestinal: Negative for abdominal pain, constipation and vomiting. Endocrine: Negative. Genitourinary: Negative for difficulty urinating and dysuria. Musculoskeletal: Positive for back pain. Negative for neck pain and neck stiffness. Reports history of chronic back pain, denies recent falls or trauma. Skin: Positive for color change. Jaundice, recently diagnosed with hepatitis   Allergic/Immunologic: Negative. Neurological: Negative for weakness and headaches. Hematological: Negative. Psychiatric/Behavioral: Positive for behavioral problems, dysphoric mood and hallucinations. Negative for suicidal ideas. The patient is nervous/anxious. Positives and Pertinent negatives as per HPI. Except as noted above in the ROS, all other systems were reviewed and negative.        PAST MEDICAL HISTORY     Past Medical History:   Diagnosis Date    DDD (degenerative disc disease), lumbosacral 1/15/2014    Depression     Low back pain     HERNIATED DISC    Ovarian cyst          SURGICAL HISTORY     Past Surgical History:   Procedure Laterality Date    BONY PELVIS SURGERY      LEFT SIDE FOR BONE GRAFT FOR NECK    ENDOMETRIAL ABLATION      LEEP      X2    NECK SURGERY      OTHER SURGICAL HISTORY      SPINAL CORD STIMULATOR    TUBAL LIGATION           CURRENTMEDICATIONS       Previous Medications    HYDROXYZINE (VISTARIL) 50 MG CAPSULE    Take 50 mg by mouth 4 times daily as needed for Anxiety    LAMOTRIGINE (LAMICTAL) 25 MG TABLET    Take 25 mg by mouth 2 times daily    RISPERIDONE (RISPERDAL) 1 MG TABLET    Take 1 mg by mouth 2 times daily         ALLERGIES     Amoxicillin-pot clavulanate    FAMILYHISTORY       Family History   Problem Relation Age of Onset    Cancer Other     Diabetes Other     Heart Disease Other     High Blood Pressure Other     Stroke Other           SOCIAL HISTORY       Social History     Tobacco Use    Smoking status: Current Every Day Smoker     Packs/day: 1.00     Types: Cigarettes    Smokeless tobacco: Never Used   Substance Use Topics    Alcohol use: Yes     Alcohol/week: 0.0 standard drinks     Comment: OCCASIONALLY    Drug use: Yes     Types: Methamphetamines       SCREENINGS             PHYSICAL EXAM    (up to 7 for level 4, 8 or more for level 5)     ED Triage Vitals [01/09/21 1700]   BP Temp Temp Source Pulse Resp SpO2 Height Weight   125/79 98.9 °F (37.2 °C) Oral 95 18 97 % 5' 9.5\" (1.765 m) 175 lb (79.4 kg)       Physical Exam  Vitals signs and nursing note reviewed. Constitutional:       Appearance: Normal appearance. Comments: Disheveled   HENT:      Head: Normocephalic and atraumatic. Right Ear: External ear normal.      Left Ear: External ear normal.      Nose: Nose normal.   Eyes:      General:         Right eye: No discharge. Left eye: No discharge. Neck:      Musculoskeletal: Normal range of motion and neck supple. Pulmonary:      Effort: Pulmonary effort is normal. No respiratory distress. Skin:     General: Skin is warm and dry. Neurological:      Mental Status: She is alert and oriented to person, place, and time.    Psychiatric:         Mood and Affect: Mood normal.         Behavior: Behavior normal.         DIAGNOSTIC RESULTS   LABS:    Labs Reviewed   COMPREHENSIVE METABOLIC PANEL W/ REFLEX TO MG FOR LOW K - Abnormal; Notable for the following components:       Result Value    Total Bilirubin 3.3 (*)     All other components within normal limits    Narrative:     Performed at:  [facility] [address], [place], [address]   Phone [phone]   [address]  Abnormal; Notable for the following components:    Amphetamine Screen, Urine POSITIVE (*)     All other components within normal limits    Narrative:     Performed at:  [facility] [address], [place]   Phone [phone]   URINE RT REFLEX TO CULTURE - Abnormal; Notable for the following components:    Clarity, UA CLOUDY (*)     Bilirubin Urine SMALL (*)     Ketones, Urine TRACE (*)     Leukocyte Esterase, Urine MODERATE (*)     All other components within normal limits    Narrative:     Performed at:  [facility] [address] [place], [place]   Phone [phone]   ACETAMINOPHEN LEVEL - Abnormal; Notable for the following components:    Acetaminophen Level <5 (*)     All other components within normal limits    Narrative:     Performed at:  [facility] [address] [place], [place]   Phone [phone]   SALICYLATE LEVEL - Abnormal; Notable for the following components:    Salicylate, Serum <5.0 (*)     All other components within normal limits    Narrative:     Performed at:  [facility] [address] [place], [place]   Phone [phone]   MICROSCOPIC URINALYSIS - Abnormal; Notable for the following components:    Bacteria, UA 2+ (*)     WBC, UA 18 (*)     Epithelial Cells, UA 18 (*)     All other components within normal limits    Narrative:     Performed at:  [facility] [address] [facility] [address] [place], [place]   Phone [phone]   CULTURE, URINE   CBC WITH AUTO DIFFERENTIAL    Narrative:     Performed at:  [facility] [address] [address] [place], [place]   Phone [phone]   HCG, SERUM, QUALITATIVE    Narrative:     Performed at:  [facility] [address], [place]   Phone [phone]   ETHANOL    Narrative: Performed at:  [facility]  [address] [address]   Phone [phone]    Narrative:     Performed at:  [facility]  [address], [address]   Phone [phone]       All other labs were within normal range or not returned as of this dictation. EKG: All EKG's are interpreted by the Emergency Department Physician in the absence of a cardiologist.  Please see their note for interpretation of EKG. RADIOLOGY:   Non-plain film images such as CT, Ultrasound and MRI are read by the radiologist. Plain radiographic images are visualized and preliminarily interpreted by the ED Provider with the below findings:        Interpretation per the Radiologist below, if available at the time of this note:    No orders to display     No results found. PROCEDURES   Unless otherwise noted below, none     Procedures    CRITICAL CARE TIME   N/A    CONSULTS:  None      EMERGENCY DEPARTMENT COURSE and DIFFERENTIAL DIAGNOSIS/MDM:   Vitals:    Vitals:    01/09/21 1700   BP: 125/79   Pulse: 95   Resp: 18   Temp: 98.9 °F (37.2 °C)   TempSrc: Oral   SpO2: 97%   Weight: 175 lb (79.4 kg)   Height: 5' 9.5\" (1.765 m)       Patient was given the following medications:  Medications   LORazepam (ATIVAN) injection 1 mg (has no administration in time range)   ziprasidone (GEODON) injection 20 mg (has no administration in time range)       Nursing notes reviewed. This is a 69-year-old female with history of bipolar bipolar disorder who presents for psychiatric evaluation. Patient is noncompliant with her psych meds. She admits to polysubstance abuse and states that she used methamphetamines just 2 days ago. She is demonstrating paranoia and admits to having both visual and auditory hallucinations. Patient recently seen at [facility] ED just yesterday evening for similar symptoms however she was DISCHARGED. Skull exam complete. Patient is nontoxic and afebrile. She is calm and cooperative at present however she does appear to be responding to internal stimuli. She denies suicidal ideations. Patient is jaundice. She does report history of hep B diagnosis back in October. Laboratory studies pending. 18:55-patient noncompliant with blood draw. She appears to be responding to internal stimuli. Laboratory studies resulted. Urine drug screen is positive for methamphetamines. Ulices Carbo is 3.3 however remaining LFTs have improved from previous. Patient's abdomen is soft and nontender. She has remained stable throughout this visit. At this time she is deemed medically stable. This case is discussed with ED attending Dr. Megan Barajas who also perform face-to-face evaluation agrees the patient will benefit from admission to psychiatric facility for further evaluation and treatment. Patient is in agreement with this plan of care. 19:38-Transfer pending          FINAL IMPRESSION      1. Methamphetamine abuse ([facility]    2. Psychosis, unspecified psychosis type ([facility]    3. History of hepatitis B virus infection          DISPOSITION/PLAN   DISPOSITION        PATIENT REFERREDTO:  No follow-up provider specified.     DISCHARGE MEDICATIONS:  New Prescriptions    No medications on file       DISCONTINUED MEDICATIONS:  Discontinued Medications    No medications on file              (Please note that portions of this note were completed with a voice recognition program.  Efforts were made to edit the dictations but occasionally words are mis-transcribed.)    KAITLIN Kline CNP (electronically signed)           KAITLIN Kline CNP  01/09/21 1938

## 2021-01-10 VITALS
OXYGEN SATURATION: 96 % | BODY MASS INDEX: 25.05 KG/M2 | HEART RATE: 82 BPM | WEIGHT: 175 LBS | SYSTOLIC BLOOD PRESSURE: 104 MMHG | DIASTOLIC BLOOD PRESSURE: 71 MMHG | HEIGHT: 70 IN | TEMPERATURE: 97.8 F | RESPIRATION RATE: 16 BRPM

## 2021-01-10 LAB
EKG ATRIAL RATE: 92 BPM
EKG DIAGNOSIS: NORMAL
EKG P AXIS: 74 DEGREES
EKG P-R INTERVAL: 134 MS
EKG Q-T INTERVAL: 416 MS
EKG QRS DURATION: 114 MS
EKG QTC CALCULATION (BAZETT): 514 MS
EKG R AXIS: -12 DEGREES
EKG T AXIS: 90 DEGREES
EKG VENTRICULAR RATE: 92 BPM
URINE CULTURE, ROUTINE: NORMAL

## 2021-01-10 PROCEDURE — 93010 ELECTROCARDIOGRAM REPORT: CPT | Performed by: INTERNAL MEDICINE

## 2021-01-10 NOTE — ED NOTES
Bedside report received from [address] [place] [address]. Pt resting comfortably. Respirations easy and even. No s/s of distress noted. Room remains safe. Constant observer at the bedside. Will continue to monitor.      Sunny Duckworth RN  01/10/21 0123

## 2021-01-10 NOTE — ED NOTES
Pt in bed. Sitter continued at bedside. Pt sleeping in bed, no needs expressed at this time. Will continue to monitor.         Sheree Schilder, RN  01/10/21 9703

## 2021-01-10 NOTE — ED NOTES
Pt lying in bed, left side. Respirations easy and even. Bed in lowest position and locked. Constant observer remains at the bedside.      [facility]  01/10/21 8337

## 2021-01-10 NOTE — ED PROVIDER NOTES
This patient was seen by the Mid-Level Provider. I have seen and examined the patient, agree with the workup, evaluation, management and diagnosis. Care plan has been discussed. My assessment reveals a 67-year-old female who presents with suicidal and homicidal ideations. This is a 67-year-old female with a known history of bipolar disease who was recently seen by another hospital this morning per the patient. She presents today stating that she feels like somebody will hurt her. She denies any attempt to hurt herself or any drug ingestions. Radiology results:    No orders to display     EKG: Sinus rhythm at a rate of 92 beats a minute with no acute ST elevations or depressions or pathologic Q waves.     LABS:    Labs Reviewed   COMPREHENSIVE METABOLIC PANEL W/ REFLEX TO MG FOR LOW K - Abnormal; Notable for the following components:       Result Value    Total Bilirubin 3.3 (*)     All other components within normal limits    Narrative:     Performed at:  [facility] [address]   Phone [phone]   [address] - Abnormal; Notable for the following components:    Amphetamine Screen, Urine POSITIVE (*)     All other components within normal limits    Narrative:     Performed at:  [facility] [address] [facility] [email or web address]   Phone [phone]   URINE RT REFLEX TO CULTURE - Abnormal; Notable for the following components:    Clarity, UA CLOUDY (*)     Bilirubin Urine SMALL (*)     Ketones, Urine TRACE (*)     Leukocyte Esterase, Urine MODERATE (*)     All other components within normal limits    Narrative:     Performed at:  [facility] [address], [place], [facility] [place]   Phone [phone]   ACETAMINOPHEN LEVEL - Abnormal; Notable for the following components:    Acetaminophen Level <5 (*)     All other components within normal limits    Narrative: Performed at:  [facility] [address] [place],  [place], [address]   Phone [phone]   SALICYLATE LEVEL - Abnormal; Notable for the following components:    Salicylate, Serum <9.9 (*)     All other components within normal limits    Narrative:     Performed at:  [facility] [address]  [place], [address]   Phone [phone]   MICROSCOPIC URINALYSIS - Abnormal; Notable for the following components:    Bacteria, UA 2+ (*)     Hyaline Casts, UA 13 (*)     WBC, UA 18 (*)     Epithelial Cells, UA 18 (*)     All other components within normal limits    Narrative:     Performed at:  [facility] [address]  [place], [place]   Phone [phone]   CULTURE, URINE   CBC WITH AUTO DIFFERENTIAL    Narrative:     Performed at:  [facility] [address] [place],  [place], [place]   Phone [phone]   HCG, SERUM, QUALITATIVE    Narrative:     Performed at:  [facility] [address] [place],  [place], [place]   Phone [phone]   ETHANOL    Narrative:     Performed at:  [facility] [address], [place]   Phone [phone]    Narrative:     Performed at:  [facility] [address] [place],  [place], [place]   Phone [phone]               Exam:    Well-nourished female, calm in no acute distress. She had a flat affect but a normal thought process. Medical decision makin-year-old female with a history of bipolar disease who presents with suicidal ideations. The please affiliate attention sheet for the patient and she has been medically cleared and will be transferred to psychiatry services when available. She is in stable condition. FINAL IMPRESSION:    1. Methamphetamine abuse ([address]    2.  Psychosis, unspecified psychosis type ([facility]    3.  History of hepatitis B virus infection            Marion Michaels MD  01/09/21 2126       Marion Michaels MD  01/09/21 2129

## 2021-01-10 NOTE — ED NOTES
Report called to Kika Mckenzie, nurse at [facility]. All questions answered at this time.      Alirio [facility]  01/10/21 4254

## 2021-01-10 NOTE — ED NOTES
Pt attempted to run from her room out the front door. Able to be redirected back to room with staff and security present.   Pt back in bed and medicated      Elsy Escalona RN  01/09/21 2037

## 2021-10-08 ENCOUNTER — APPOINTMENT (OUTPATIENT)
Dept: CT IMAGING | Age: 56
End: 2021-10-08
Payer: MEDICARE

## 2021-10-08 ENCOUNTER — HOSPITAL ENCOUNTER (EMERGENCY)
Age: 56
Discharge: HOME | End: 2021-10-08
Payer: MEDICARE

## 2021-10-08 VITALS
WEIGHT: 181 LBS | TEMPERATURE: 98.3 F | RESPIRATION RATE: 16 BRPM | DIASTOLIC BLOOD PRESSURE: 84 MMHG | HEART RATE: 80 BPM | HEIGHT: 69 IN | OXYGEN SATURATION: 97 % | BODY MASS INDEX: 26.81 KG/M2 | SYSTOLIC BLOOD PRESSURE: 107 MMHG

## 2021-10-08 DIAGNOSIS — Z87.820 HISTORY OF CLOSED HEAD INJURY: Primary | ICD-10-CM

## 2021-10-08 DIAGNOSIS — F07.81 POSTCONCUSSION SYNDROME: ICD-10-CM

## 2021-10-08 DIAGNOSIS — Z91.81 STATUS POST FALL: ICD-10-CM

## 2021-10-08 PROCEDURE — 70450 CT HEAD/BRAIN W/O DYE: CPT

## 2021-10-08 PROCEDURE — 99283 EMERGENCY DEPT VISIT LOW MDM: CPT

## 2021-10-08 PROCEDURE — 6370000000 HC RX 637 (ALT 250 FOR IP): Performed by: PHYSICIAN ASSISTANT

## 2021-10-08 RX ORDER — ACETAMINOPHEN 500 MG
1000 TABLET ORAL ONCE
Status: COMPLETED | OUTPATIENT
Start: 2021-10-08 | End: 2021-10-08

## 2021-10-08 RX ORDER — ONDANSETRON 4 MG/1
4 TABLET, ORALLY DISINTEGRATING ORAL ONCE
Status: COMPLETED | OUTPATIENT
Start: 2021-10-08 | End: 2021-10-08

## 2021-10-08 RX ORDER — BUTALBITAL, ACETAMINOPHEN AND CAFFEINE 300; 40; 50 MG/1; MG/1; MG/1
1 CAPSULE ORAL EVERY 4 HOURS PRN
Qty: 21 CAPSULE | Refills: 0 | Status: SHIPPED | OUTPATIENT
Start: 2021-10-08 | End: 2021-10-13

## 2021-10-08 RX ORDER — ONDANSETRON 4 MG/1
4 TABLET, ORALLY DISINTEGRATING ORAL EVERY 8 HOURS PRN
Qty: 20 TABLET | Refills: 0 | Status: SHIPPED | OUTPATIENT
Start: 2021-10-08 | End: 2022-07-25

## 2021-10-08 RX ADMIN — ACETAMINOPHEN 1000 MG: 500 TABLET ORAL at 14:27

## 2021-10-08 RX ADMIN — ONDANSETRON 4 MG: 4 TABLET, ORALLY DISINTEGRATING ORAL at 14:27

## 2021-10-08 ASSESSMENT — ENCOUNTER SYMPTOMS
VOMITING: 0
DIARRHEA: 0
ABDOMINAL PAIN: 0
CHEST TIGHTNESS: 0
SHORTNESS OF BREATH: 0
NAUSEA: 0

## 2021-10-08 ASSESSMENT — PAIN SCALES - GENERAL
PAINLEVEL_OUTOF10: 7
PAINLEVEL_OUTOF10: 7

## 2021-10-08 ASSESSMENT — PAIN DESCRIPTION - LOCATION: LOCATION: HEAD

## 2021-10-08 ASSESSMENT — PAIN DESCRIPTION - PAIN TYPE: TYPE: CHRONIC PAIN

## 2021-10-08 NOTE — ED PROVIDER NOTES
[facility]        Pt Name: Kleber Banks  MRN: [id number]  [place] 1965  Date of evaluation: 10/8/2021  Provider: Jaja Genao PA-C  PCP: Amy Ku MD  Note Started: 2:22 PM EDT       MOE. I have evaluated this patient. My supervising physician was available for consultation. CHIEF COMPLAINT       Chief Complaint   Patient presents with    Head Injury     pt had a head injury years ago and has had intermittent pain off and on, FP did a ct scan 2 mths ago that was negative, pt slipped on water and hit her head again 2 wks ago and now the pain has not gone away       HISTORY OF PRESENT ILLNESS   (Location, Timing/Onset, Context/Setting, Quality, Duration, Modifying Factors, Severity, Associated Signs and Symptoms)  Note limiting factors. Chief Complaint: Status post fall    Kleber Banks is a 55 y.o. female who presents the emerge department difficulty as it pertains to posterior lateral left-sided head pain. Patient states that she had a closed head injury years ago. She is had intermittent difficulties as a pertains to it ever since her head injury. Patient states unfortunately 2 weeks ago she had an injury from a fall. She tells me she was in a wet bathroom when she slipped and fell and struck the same aspect of her head. Since that time she has been taking Aleve as well as Tylenol with marginal levels of relief. She tells me that she does have intermittent headache pain has a difficult time concentrating and there are symptoms from time to time of mild nausea. Patient states that she is able to independently ambulate. She states that she does not follow with neurology. She states she has not seen her primary care physician for at this time because of the acuity injury. Patient states that she is not experiencing chest pain palpitations lightheadedness or shortness of breath.   No additional GI or  complaints. Current headache pain rates to be 7 out of 10 and with that she presents the ED for evaluation and treatment. Nursing Notes were all reviewed and agreed with or any disagreements were addressed in the HPI. REVIEW OF SYSTEMS    (2-9 systems for level 4, 10 or more for level 5)     Review of Systems   Constitutional: Negative for activity change, chills and fever. Respiratory: Negative for chest tightness and shortness of breath. Cardiovascular: Negative for chest pain. Gastrointestinal: Negative for abdominal pain, diarrhea, nausea and vomiting. Genitourinary: Negative for dysuria and flank pain. Neurological: Positive for headaches. All other systems reviewed and are negative. Positives and Pertinent negatives as per HPI. Except as noted above in the ROS, all other systems were reviewed and negative.        PAST MEDICAL HISTORY     Past Medical History:   Diagnosis Date    DDD (degenerative disc disease), lumbosacral 1/15/2014    Depression     Low back pain     HERNIATED DISC    Ovarian cyst          SURGICAL HISTORY     Past Surgical History:   Procedure Laterality Date    BONY PELVIS SURGERY      LEFT SIDE FOR BONE GRAFT FOR NECK    ENDOMETRIAL ABLATION      LEEP      X2    NECK SURGERY      OTHER SURGICAL HISTORY      SPINAL CORD STIMULATOR    TUBAL LIGATION           CURRENTMEDICATIONS       Previous Medications    HYDROXYZINE (VISTARIL) 50 MG CAPSULE    Take 50 mg by mouth 4 times daily as needed for Anxiety    LAMOTRIGINE (LAMICTAL) 25 MG TABLET    Take 25 mg by mouth 2 times daily    RISPERIDONE (RISPERDAL) 1 MG TABLET    Take 1 mg by mouth 2 times daily         ALLERGIES     Amoxicillin-pot clavulanate    FAMILYHISTORY       Family History   Problem Relation Age of Onset    Cancer Other     Diabetes Other     Heart Disease Other     High Blood Pressure Other     Stroke Other           SOCIAL HISTORY       Social History     Tobacco Use    Smoking status: Current Every Day Smoker     Packs/day: 1.00     Types: Cigarettes    Smokeless tobacco: Never Used   Vaping Use    Vaping Use: Never used   Substance Use Topics    Alcohol use: Yes     Alcohol/week: 0.0 standard drinks     Comment: OCCASIONALLY    Drug use: Yes     Types: Methamphetamines       SCREENINGS             PHYSICAL EXAM    (up to 7 for level 4, 8 or more for level 5)     ED Triage Vitals [10/08/21 1405]   BP Temp Temp Source Pulse Resp SpO2 Height Weight   107/84 98.3 °F (36.8 °C) Oral 80 16 97 % 5' 9\" (1.753 m) 181 lb (82.1 kg)       Physical Exam  Vitals and nursing note reviewed. Constitutional:       General: She is awake. She is not in acute distress. Appearance: Normal appearance. She is well-developed. She is not ill-appearing, toxic-appearing or diaphoretic. HENT:      Head: Normocephalic and atraumatic. No raccoon eyes, Drummond's sign or laceration. Jaw: There is normal jaw occlusion. Right Ear: Hearing and external ear normal.      Left Ear: Hearing and external ear normal.      Nose: Nose normal.   Eyes:      General: No visual field deficit or scleral icterus. Right eye: No discharge. Left eye: No discharge. Conjunctiva/sclera: Conjunctivae normal.   Neck:      Vascular: No JVD. Cardiovascular:      Rate and Rhythm: Normal rate and regular rhythm. Heart sounds: No murmur heard. No friction rub. No gallop. Pulmonary:      Effort: Pulmonary effort is normal. No accessory muscle usage or respiratory distress. Breath sounds: Normal breath sounds. No wheezing, rhonchi or rales. Musculoskeletal:      Cervical back: Normal range of motion. Skin:     General: Skin is warm and dry. Neurological:      General: No focal deficit present. Mental Status: She is alert and oriented to person, place, and time. GCS: GCS eye subscore is 4. GCS verbal subscore is 5. GCS motor subscore is 6.       Cranial Nerves: Cranial nerves are intact. No cranial nerve deficit, dysarthria or facial asymmetry. Sensory: Sensation is intact. No sensory deficit. Motor: Motor function is intact. No tremor, abnormal muscle tone, seizure activity or pronator drift. Coordination: Coordination is intact. Coordination normal. Finger-Nose-Finger Test normal.      Gait: Gait is intact. Deep Tendon Reflexes: Reflexes are normal and symmetric. Psychiatric:         Behavior: Behavior normal. Behavior is cooperative. DIAGNOSTIC RESULTS   LABS:    Labs Reviewed - No data to display    When ordered only abnormal lab results are displayed. All other labs were within normal range or not returned as of this dictation. EKG: When ordered, EKG's are interpreted by the Emergency Department Physician in the absence of a cardiologist.  Please see their note for interpretation of EKG. RADIOLOGY:   Non-plain film images such as CT, Ultrasound and MRI are read by the radiologist. Plain radiographic images are visualized and preliminarily interpreted by the ED Provider with the below findings:        Interpretation per the Radiologist below, if available at the time of this note:    CT HEAD WO CONTRAST   Final Result   No acute traumatic intracranial abnormality.       Mild small-vessel ischemic change, similar to prior             PROCEDURES   Unless otherwise noted below, none     Procedures    CRITICAL CARE TIME   N/A    CONSULTS:  None      EMERGENCY DEPARTMENT COURSE and DIFFERENTIAL DIAGNOSIS/MDM:   Vitals:    Vitals:    10/08/21 1405   BP: 107/84   Pulse: 80   Resp: 16   Temp: 98.3 °F (36.8 °C)   TempSrc: Oral   SpO2: 97%   Weight: 181 lb (82.1 kg)   Height: 5' 9\" (1.753 m)       Patient was given the following medications:  Medications   acetaminophen (TYLENOL) tablet 1,000 mg (1,000 mg Oral Given 10/8/21 1427)   ondansetron (ZOFRAN-ODT) disintegrating tablet 4 mg (4 mg Oral Given 10/8/21 1427)           The patient's detailed history of present illness is documented as above. Upon arrival to the emergency department the patient's vital signs are as documented. The patient is noted to be hemodynamically stable and afebrile. Physical examination findings are as above. In light of the above-mentioned I did proceed with imaging. Patient symptoms of pain and discomfort were treated here in the emergency department. CT demonstrates no evidence of acute intercranial abnormality. Clinically she is demonstrating symptoms postconcussive in nature. Medications for the home environment follow-up with her primary care physician. Strict potential instructions for return. The patient presents with a benign-appearing headache. The neurologic examination of this patient is as documented above and is normal.  My suspicion for serious pathology is low given a lack of significant risk factors and reassuring history and physical examination. I see nothing to suggest subarachnoid hemorrhage, meningitis, encephalitis, mass lesion, intercranial bleeding or thrombosis. I feel the patient can be safely discharged to home with outpatient follow up. Instructions have been given for the patient to return if there is any significant worsening of the headache or the development of confusion, vision change, weakness, numbness, difficulty with speech or walking. FINAL IMPRESSION      1. History of closed head injury    2. Status post fall    3.  Postconcussion syndrome          DISPOSITION/PLAN   DISPOSITION Decision To Discharge 10/08/2021 03:57:59 PM      PATIENT REFERRED TO:  Kelsey Hazel MD  [place] [address]  [phone]    Schedule an appointment as soon as possible for a visit       [facility] Emergency Department  [address] [place] [address]  [phone]    If symptoms worsen      DISCHARGE MEDICATIONS:  New Prescriptions    BUTALBITAL-APAP-CAFFEINE (FIORICET) -40 MG CAPS PER CAPSULE    Take 1 capsule by mouth every 4 hours as needed for Headaches or Migraine    ONDANSETRON (ZOFRAN ODT) 4 MG DISINTEGRATING TABLET    Take 1 tablet by mouth every 8 hours as needed for Nausea       DISCONTINUED MEDICATIONS:  Discontinued Medications    No medications on file              (Please note that portions of this note were completed with a voice recognition program.  Efforts were made to edit the dictations but occasionally words are mis-transcribed.)    Shaista Medina PA-C (electronically signed)           Rigo Larson PA-C  10/08/21 1600

## 2021-11-30 ENCOUNTER — APPOINTMENT (OUTPATIENT)
Dept: CT IMAGING | Age: 56
End: 2021-11-30
Payer: MEDICARE

## 2021-11-30 ENCOUNTER — HOSPITAL ENCOUNTER (EMERGENCY)
Age: 56
Discharge: PSYCHIATRIC OR SUBSTANCE USE FACILITY | End: 2021-12-01
Attending: EMERGENCY MEDICINE
Payer: MEDICARE

## 2021-11-30 ENCOUNTER — APPOINTMENT (OUTPATIENT)
Dept: GENERAL RADIOLOGY | Age: 56
End: 2021-11-30
Payer: MEDICARE

## 2021-11-30 DIAGNOSIS — F06.1 CATATONIC STATE: Primary | ICD-10-CM

## 2021-11-30 LAB
A/G RATIO: 1.5 (ref 1.1–2.2)
ACETAMINOPHEN LEVEL: <5 UG/ML (ref 10–30)
ALBUMIN SERPL-MCNC: 4.3 G/DL (ref 3.4–5)
ALP BLD-CCNC: 79 U/L (ref 40–129)
ALT SERPL-CCNC: 22 U/L (ref 10–40)
AMMONIA: 21 UMOL/L (ref 11–51)
AMPHETAMINE SCREEN, URINE: NORMAL
ANION GAP SERPL CALCULATED.3IONS-SCNC: 16 MMOL/L (ref 3–16)
AST SERPL-CCNC: 26 U/L (ref 15–37)
BARBITURATE SCREEN URINE: NORMAL
BASE EXCESS VENOUS: -1.8 MMOL/L (ref -3–3)
BASOPHILS ABSOLUTE: 0.1 K/UL (ref 0–0.2)
BASOPHILS RELATIVE PERCENT: 1.8 %
BENZODIAZEPINE SCREEN, URINE: NORMAL
BILIRUB SERPL-MCNC: 0.7 MG/DL (ref 0–1)
BILIRUBIN URINE: ABNORMAL
BLOOD, URINE: NEGATIVE
BUN BLDV-MCNC: 21 MG/DL (ref 7–20)
CALCIUM SERPL-MCNC: 9.9 MG/DL (ref 8.3–10.6)
CANNABINOID SCREEN URINE: NORMAL
CARBOXYHEMOGLOBIN: 8.8 % (ref 0–1.5)
CHLORIDE BLD-SCNC: 100 MMOL/L (ref 99–110)
CLARITY: CLEAR
CO2: 20 MMOL/L (ref 21–32)
COCAINE METABOLITE SCREEN URINE: NORMAL
COLOR: ABNORMAL
CREAT SERPL-MCNC: 0.8 MG/DL (ref 0.6–1.1)
EOSINOPHILS ABSOLUTE: 0.1 K/UL (ref 0–0.6)
EOSINOPHILS RELATIVE PERCENT: 1.4 %
ETHANOL: NORMAL MG/DL (ref 0–0.08)
GFR AFRICAN AMERICAN: >60
GFR NON-AFRICAN AMERICAN: >60
GLUCOSE BLD-MCNC: 97 MG/DL (ref 70–99)
GLUCOSE URINE: NEGATIVE MG/DL
HCO3 VENOUS: 19.7 MMOL/L (ref 23–29)
HCT VFR BLD CALC: 43.1 % (ref 36–48)
HEMOGLOBIN: 14.6 G/DL (ref 12–16)
KETONES, URINE: >=80 MG/DL
LACTIC ACID, SEPSIS: 1.8 MMOL/L (ref 0.4–1.9)
LEUKOCYTE ESTERASE, URINE: NEGATIVE
LYMPHOCYTES ABSOLUTE: 2.1 K/UL (ref 1–5.1)
LYMPHOCYTES RELATIVE PERCENT: 32.9 %
Lab: NORMAL
MAGNESIUM: 2.1 MG/DL (ref 1.8–2.4)
MCH RBC QN AUTO: 28.7 PG (ref 26–34)
MCHC RBC AUTO-ENTMCNC: 34 G/DL (ref 31–36)
MCV RBC AUTO: 84.5 FL (ref 80–100)
METHADONE SCREEN, URINE: NORMAL
METHEMOGLOBIN VENOUS: 0 %
MICROSCOPIC EXAMINATION: ABNORMAL
MONOCYTES ABSOLUTE: 0.7 K/UL (ref 0–1.3)
MONOCYTES RELATIVE PERCENT: 10.9 %
NEUTROPHILS ABSOLUTE: 3.5 K/UL (ref 1.7–7.7)
NEUTROPHILS RELATIVE PERCENT: 53 %
NITRITE, URINE: NEGATIVE
O2 CONTENT, VEN: 22 VOL %
O2 SAT, VEN: 100 %
O2 THERAPY: ABNORMAL
OPIATE SCREEN URINE: NORMAL
OXYCODONE URINE: NORMAL
PCO2, VEN: 25.9 MMHG (ref 40–50)
PDW BLD-RTO: 13.5 % (ref 12.4–15.4)
PH UA: 6
PH UA: 6 (ref 5–8)
PH VENOUS: 7.49 (ref 7.35–7.45)
PHENCYCLIDINE SCREEN URINE: NORMAL
PLATELET # BLD: 223 K/UL (ref 135–450)
PMV BLD AUTO: 9 FL (ref 5–10.5)
PO2, VEN: 200 MMHG (ref 25–40)
POTASSIUM REFLEX MAGNESIUM: 3.8 MMOL/L (ref 3.5–5.1)
PROPOXYPHENE SCREEN: NORMAL
PROTEIN UA: NEGATIVE MG/DL
RBC # BLD: 5.1 M/UL (ref 4–5.2)
SALICYLATE, SERUM: <0.3 MG/DL (ref 15–30)
SARS-COV-2, NAAT: NOT DETECTED
SODIUM BLD-SCNC: 136 MMOL/L (ref 136–145)
SPECIFIC GRAVITY UA: 1.03 (ref 1–1.03)
TCO2 CALC VENOUS: 46 MMOL/L
TOTAL PROTEIN: 7.1 G/DL (ref 6.4–8.2)
TROPONIN: <0.01 NG/ML
URINE REFLEX TO CULTURE: ABNORMAL
URINE TYPE: ABNORMAL
UROBILINOGEN, URINE: 1 E.U./DL
WBC # BLD: 6.5 K/UL (ref 4–11)

## 2021-11-30 PROCEDURE — 83735 ASSAY OF MAGNESIUM: CPT

## 2021-11-30 PROCEDURE — 84484 ASSAY OF TROPONIN QUANT: CPT

## 2021-11-30 PROCEDURE — 70450 CT HEAD/BRAIN W/O DYE: CPT

## 2021-11-30 PROCEDURE — 82077 ASSAY SPEC XCP UR&BREATH IA: CPT

## 2021-11-30 PROCEDURE — 81003 URINALYSIS AUTO W/O SCOPE: CPT

## 2021-11-30 PROCEDURE — 2580000003 HC RX 258: Performed by: PHYSICIAN ASSISTANT

## 2021-11-30 PROCEDURE — 87635 SARS-COV-2 COVID-19 AMP PRB: CPT

## 2021-11-30 PROCEDURE — 80143 DRUG ASSAY ACETAMINOPHEN: CPT

## 2021-11-30 PROCEDURE — 83605 ASSAY OF LACTIC ACID: CPT

## 2021-11-30 PROCEDURE — 85025 COMPLETE CBC W/AUTO DIFF WBC: CPT

## 2021-11-30 PROCEDURE — 80179 DRUG ASSAY SALICYLATE: CPT

## 2021-11-30 PROCEDURE — 99283 EMERGENCY DEPT VISIT LOW MDM: CPT

## 2021-11-30 PROCEDURE — 51701 INSERT BLADDER CATHETER: CPT

## 2021-11-30 PROCEDURE — 2580000003 HC RX 258: Performed by: EMERGENCY MEDICINE

## 2021-11-30 PROCEDURE — 82140 ASSAY OF AMMONIA: CPT

## 2021-11-30 PROCEDURE — 36415 COLL VENOUS BLD VENIPUNCTURE: CPT

## 2021-11-30 PROCEDURE — 93005 ELECTROCARDIOGRAM TRACING: CPT | Performed by: PHYSICIAN ASSISTANT

## 2021-11-30 PROCEDURE — 82803 BLOOD GASES ANY COMBINATION: CPT

## 2021-11-30 PROCEDURE — 80053 COMPREHEN METABOLIC PANEL: CPT

## 2021-11-30 PROCEDURE — 71045 X-RAY EXAM CHEST 1 VIEW: CPT

## 2021-11-30 PROCEDURE — 80307 DRUG TEST PRSMV CHEM ANLYZR: CPT

## 2021-11-30 RX ORDER — LORAZEPAM 2 MG/ML
2 INJECTION INTRAMUSCULAR ONCE
Status: COMPLETED | OUTPATIENT
Start: 2021-12-01 | End: 2021-12-01

## 2021-11-30 RX ORDER — SODIUM CHLORIDE, SODIUM LACTATE, POTASSIUM CHLORIDE, AND CALCIUM CHLORIDE .6; .31; .03; .02 G/100ML; G/100ML; G/100ML; G/100ML
1000 INJECTION, SOLUTION INTRAVENOUS ONCE
Status: COMPLETED | OUTPATIENT
Start: 2021-11-30 | End: 2021-11-30

## 2021-11-30 RX ORDER — SODIUM CHLORIDE, SODIUM LACTATE, POTASSIUM CHLORIDE, CALCIUM CHLORIDE 600; 310; 30; 20 MG/100ML; MG/100ML; MG/100ML; MG/100ML
1000 INJECTION, SOLUTION INTRAVENOUS ONCE
Status: COMPLETED | OUTPATIENT
Start: 2021-11-30 | End: 2021-12-01

## 2021-11-30 RX ADMIN — SODIUM CHLORIDE, POTASSIUM CHLORIDE, SODIUM LACTATE AND CALCIUM CHLORIDE 1000 ML: 600; 310; 30; 20 INJECTION, SOLUTION INTRAVENOUS at 20:00

## 2021-11-30 RX ADMIN — SODIUM CHLORIDE, POTASSIUM CHLORIDE, SODIUM LACTATE AND CALCIUM CHLORIDE 1000 ML: 600; 310; 30; 20 INJECTION, SOLUTION INTRAVENOUS at 21:45

## 2021-11-30 NOTE — ED NOTES
Bed: 11  Expected date:   Expected time:   Means of arrival:   Comments:  [place]-needs blood     Han Rojas RN  11/30/21 4092

## 2021-11-30 NOTE — ED PROVIDER NOTES
[facility]        Pt Name: Joy Kerns  MRN: [id number]  [place] 1965  Date of evaluation: 11/30/2021  Provider: Og Wright PA-C  PCP: Dominic Nuñez MD  Note Started: 6:08 PM EST        I have seen and evaluated this patient with my supervising physician Lisbeth Titus MD.    [address] [place] [address]       Chief Complaint   Patient presents with    Altered Mental Status     Pt brought in per [facility], pt called police for unknown reason, pt has been off meds for unknown amount of time, pt was talking to police and EMS when they first arrived then stopped talking, pt awake but staring off. . HISTORY OF PRESENT ILLNESS   (Location, Timing/Onset, Context/Setting, Quality, Duration, Modifying Factors, Severity, Associated Signs and Symptoms)  Note limiting factors. Chief Complaint: Altered mental status    Joy Kerns is a 55 y.o. female who presents to the emergency department with acute encephalopathy. Patient is drooling with rhinorrhea. Just looks around the room but does not answer questions. Per windy initially called police and states that she has not been taking her medication. Unsure what medication she is on. They called the ambulance and when the ambulance got she did stop talking to them. Reviewing records patient has history of bipolar disorder with cocaine and methamphetamine use. Was seen in 2019 and admitted with catatonia and acute encephalopathy thought to be related to polysubstance abuse. Medicines I have on file is that she is on Lamictal, Risperdal, Vistaril. Nursing Notes were all reviewed and agreed with or any disagreements were addressed in the HPI. REVIEW OF SYSTEMS    (2-9 systems for level 4, 10 or more for level 5)     Review of Systems    Positives and Pertinent negatives as per HPI. Except as noted above in the ROS, all other systems were reviewed and negative. PAST MEDICAL HISTORY     Past Medical History:   Diagnosis Date    DDD (degenerative disc disease), lumbosacral 1/15/2014    Depression     Low back pain     HERNIATED DISC    Ovarian cyst          SURGICAL HISTORY     Past Surgical History:   Procedure Laterality Date    BONY PELVIS SURGERY      LEFT SIDE FOR BONE GRAFT FOR NECK    ENDOMETRIAL ABLATION      LEEP      X2    NECK SURGERY      OTHER SURGICAL HISTORY      SPINAL CORD STIMULATOR    TUBAL LIGATION           CURRENTMEDICATIONS       Previous Medications    BUTALBITAL-APAP-CAFFEINE (FIORICET) -40 MG CAPS PER CAPSULE    Take 1 capsule by mouth every 4 hours as needed for Headaches or Migraine    HYDROXYZINE (VISTARIL) 50 MG CAPSULE    Take 50 mg by mouth 4 times daily as needed for Anxiety    LAMOTRIGINE (LAMICTAL) 25 MG TABLET    Take 25 mg by mouth 2 times daily    ONDANSETRON (ZOFRAN ODT) 4 MG DISINTEGRATING TABLET    Take 1 tablet by mouth every 8 hours as needed for Nausea    RISPERIDONE (RISPERDAL) 1 MG TABLET    Take 1 mg by mouth 2 times daily         ALLERGIES     Amoxicillin-pot clavulanate    FAMILYHISTORY       Family History   Problem Relation Age of Onset    Cancer Other     Diabetes Other     Heart Disease Other     High Blood Pressure Other     Stroke Other           SOCIAL HISTORY       Social History     Tobacco Use    Smoking status: Current Every Day Smoker     Packs/day: 1.00     Types: Cigarettes    Smokeless tobacco: Never Used   Vaping Use    Vaping Use: Never used   Substance Use Topics    Alcohol use:  Yes     Alcohol/week: 0.0 standard drinks     Comment: OCCASIONALLY    Drug use: Yes     Types: Methamphetamines (Crystal Meth)       SCREENINGS             PHYSICAL EXAM    (up to 7 for level 4, 8 or more for level 5)     ED Triage Vitals [11/30/21 1741]   BP Temp Temp Source Pulse Resp SpO2 Height Weight   (!) 156/94 97.7 °F (36.5 °C) Oral 91 11 98 % 5' 10\" (1.778 m) 175 lb (79.4 kg)       Physical Exam  Vitals and nursing note reviewed. Constitutional:       Appearance: She is well-developed. Comments: Patient staring. She does turn her head to the left and looks to the left related to the room. Not speaking. Does not withdrawal to pain. When placing her arm above her head she just lets it drop but does make sure she does not hit her head with her arm is dropped. HENT:      Head: Atraumatic. Nose: Rhinorrhea present. Eyes:      General:         Right eye: No discharge. Left eye: No discharge. Cardiovascular:      Rate and Rhythm: Normal rate and regular rhythm. Heart sounds: No murmur heard. No friction rub. No gallop. Pulmonary:      Effort: Pulmonary effort is normal. No respiratory distress. Breath sounds: No stridor. No wheezing, rhonchi or rales. Abdominal:      General: Bowel sounds are normal. There is no distension. Palpations: Abdomen is soft. There is no mass. Tenderness: There is no abdominal tenderness. There is no guarding or rebound. Hernia: No hernia is present. Musculoskeletal:         General: No swelling, tenderness, deformity or signs of injury. Cervical back: Normal range of motion. Skin:     General: Skin is warm and dry. Findings: No erythema or rash. Neurological:      Mental Status: She is disoriented.    Psychiatric:         Behavior: Behavior normal.         DIAGNOSTIC RESULTS   LABS:    Labs Reviewed   COMPREHENSIVE METABOLIC PANEL W/ REFLEX TO MG FOR LOW K - Abnormal; Notable for the following components:       Result Value    CO2 20 (*)     BUN 21 (*)     All other components within normal limits    Narrative:     Performed at:  [facility] [address], [place], [facility] [address]   Phone [phone]   BLOOD GAS, VENOUS - Abnormal; Notable for the following components:    pH, Mick 7.489 (*)     pCO2, Mick 25.9 (*)     pO2, Mick 200.0 (*)     HCO3, Venous 19.7 (*) Carboxyhemoglobin 8.8 (*)     All other components within normal limits    Narrative:     Performed at:  [facility] [address]  [place]   Phone [phone]   URINE RT REFLEX TO CULTURE - Abnormal; Notable for the following components:    Bilirubin Urine MODERATE (*)     Ketones, Urine >=80 (*)     All other components within normal limits    Narrative:     Performed at:  [facility] [address]  [place]   Phone [phone]   ACETAMINOPHEN LEVEL - Abnormal; Notable for the following components:    Acetaminophen Level <5 (*)     All other components within normal limits    Narrative:     Performed at:  [facility] [address]  [place]   Phone [phone]   SALICYLATE LEVEL - Abnormal; Notable for the following components:    Salicylate, Serum <2.7 (*)     All other components within normal limits    Narrative:     Performed at:  [facility] [address]  [place]   Phone [phone], RAPID    Narrative:     Performed at:  [facility] [address]  [place]   Phone [phone]   CBC WITH AUTO DIFFERENTIAL    Narrative:     lav tube clotted- cbc needs recollected- RN [facility] notified  Performed at:  [facility] [address]  [place]   Phone [phone]   TROPONIN    Narrative:     Performed at:  [facility] [address]  [place]   Phone [phone]   MAGNESIUM    Narrative:     Performed at:  [facility] [address]   Phone [phone]   LACTATE, SEPSIS    Narrative:     Performed at:  [facility] [address],  [place]   Phone [phone]   ETHANOL    Narrative:     Performed at:  [facility]  [address],  [place], [address]   Phone [phone]   AMMONIA    Narrative:     Performed at:  [facility]  [address],  [place], [address]   Phone [phone]   URINE DRUG SCREEN    Narrative:     Performed at:  [facility]  [address],  [place], [address] [employer]   Phone [phone]   LAMOTRIGINE LEVEL       When ordered only abnormal lab results are displayed. All other labs were within normal range or not returned as of this dictation. EKG: When ordered, EKG's are interpreted by the Emergency Department Physician in the absence of a cardiologist.  Please see their note for interpretation of EKG. RADIOLOGY:   Non-plain film images such as CT, Ultrasound and MRI are read by the radiologist. Plain radiographic images are visualized and preliminarily interpreted by the ED Provider with the below findings:        Interpretation per the Radiologist below, if available at the time of this note:    CT Head WO Contrast   Final Result   No acute intracranial abnormality. MRI may be obtained if clinically   indicated. XR CHEST PORTABLE   Final Result   No acute cardiopulmonary disease.                PROCEDURES   Unless otherwise noted below, none     Procedures    CRITICAL CARE TIME   N/A    CONSULTS:  None      EMERGENCY DEPARTMENT COURSE and DIFFERENTIAL DIAGNOSIS/MDM:   Vitals:    Vitals:    11/30/21 2030 11/30/21 2115 11/30/21 2130 11/30/21 2145   BP: (!) 143/86      Pulse: 81 76 84 76   Resp: 13 16 14 13   Temp:       TempSrc:       SpO2:       Weight:       Height:           Patient was given the following medications:  Medications   lactated ringers bolus (0 mLs IntraVENous Stopped 11/30/21 2145)   lactated ringers infusion 1,000 mL (1,000 mLs IntraVENous New Bag 11/30/21 2145)           Patient presented in a catatonic state.  She is looking around the room but will not talk. Reviewing chart review she has been seen like this before has been at [facility]. Her daughter even called and spoke with nursing staff and states that she has done this before. Unsure if she is actually taking her medication or not. Work-up here is unremarkable. Low suspicion for CVA, TIA or central cause of this. Low suspicion for sepsis or infectious etiology. Do not believe MRI or further work-up or testing is warranted at this time. Given that she has been seen at [facility] for this in the past will attempt to call them to see if they will accept her as having high suspicion that this is psychiatric in nature. Low suspicion for any emergent medical issue causing this. At this time patient is medically clear. Awaiting transfer at the end of my shift. Patient stable at this time. FINAL IMPRESSION      1. Catatonia          DISPOSITION/PLAN   DISPOSITION        PATIENT REFERRED TO:  No follow-up provider specified.     DISCHARGE MEDICATIONS:  New Prescriptions    No medications on file       DISCONTINUED MEDICATIONS:  Discontinued Medications    No medications on file              (Please note that portions of this note were completed with a voice recognition program.  Efforts were made to edit the dictations but occasionally words are mis-transcribed.)    Oziel Mcelroy PA-C (electronically signed)            Oziel Mcelroy PA-C  11/30/21 [facility] SHAYY Daley  11/30/21 0517

## 2021-12-01 VITALS
BODY MASS INDEX: 25.05 KG/M2 | HEART RATE: 73 BPM | SYSTOLIC BLOOD PRESSURE: 134 MMHG | OXYGEN SATURATION: 96 % | WEIGHT: 175 LBS | HEIGHT: 70 IN | DIASTOLIC BLOOD PRESSURE: 78 MMHG | TEMPERATURE: 97.7 F | RESPIRATION RATE: 15 BRPM

## 2021-12-01 LAB
EKG ATRIAL RATE: 82 BPM
EKG DIAGNOSIS: NORMAL
EKG P-R INTERVAL: 104 MS
EKG Q-T INTERVAL: 402 MS
EKG QRS DURATION: 126 MS
EKG QTC CALCULATION (BAZETT): 469 MS
EKG R AXIS: -26 DEGREES
EKG T AXIS: 151 DEGREES
EKG VENTRICULAR RATE: 82 BPM

## 2021-12-01 PROCEDURE — 6360000002 HC RX W HCPCS: Performed by: PHYSICIAN ASSISTANT

## 2021-12-01 PROCEDURE — 93010 ELECTROCARDIOGRAM REPORT: CPT | Performed by: INTERNAL MEDICINE

## 2021-12-01 PROCEDURE — 96374 THER/PROPH/DIAG INJ IV PUSH: CPT

## 2021-12-01 RX ADMIN — LORAZEPAM 2 MG: 2 INJECTION, SOLUTION INTRAMUSCULAR; INTRAVENOUS at 00:52

## 2021-12-01 NOTE — ED NOTES
This RN spoke with and update pt daughter at this time. Pt still in the same catatonic state. Lying in bed with eyes closed.  [address] [place] [address]  11/30/21 2271

## 2021-12-01 NOTE — ED NOTES
This RN spoke with Courtney Lees from [facility]. She states that she is about to review the pt case with the doctor and will give a call back. She also stated that if the pt is to be accepted, that she is to be placed on a hold.       Salvatore Bueno, [address] [place] [address]  11/30/21 6904

## 2021-12-01 NOTE — ED NOTES
Phlebotomy at bedside attempting blood draw at this time. Unsuccessful by 3 RNs. IV access has been established. IVF infusing.       [facility]  11/30/21 2001

## 2022-06-30 ENCOUNTER — APPOINTMENT (OUTPATIENT)
Dept: GENERAL RADIOLOGY | Age: 57
End: 2022-06-30
Payer: MEDICARE

## 2022-06-30 ENCOUNTER — HOSPITAL ENCOUNTER (EMERGENCY)
Age: 57
Discharge: LEFT BEFORE DISCHARGE | End: 2022-06-30
Payer: MEDICARE

## 2022-06-30 VITALS
TEMPERATURE: 98.1 F | DIASTOLIC BLOOD PRESSURE: 72 MMHG | SYSTOLIC BLOOD PRESSURE: 107 MMHG | HEART RATE: 100 BPM | OXYGEN SATURATION: 100 % | RESPIRATION RATE: 16 BRPM

## 2022-06-30 DIAGNOSIS — T67.5XXA HEAT EXHAUSTION, INITIAL ENCOUNTER: ICD-10-CM

## 2022-06-30 DIAGNOSIS — R11.0 NAUSEA: Primary | ICD-10-CM

## 2022-06-30 LAB
A/G RATIO: 1.9 (ref 1.1–2.2)
ALBUMIN SERPL-MCNC: 4.8 G/DL (ref 3.4–5)
ALP BLD-CCNC: 101 U/L (ref 40–129)
ALT SERPL-CCNC: 19 U/L (ref 10–40)
ANION GAP SERPL CALCULATED.3IONS-SCNC: 13 MMOL/L (ref 3–16)
AST SERPL-CCNC: 24 U/L (ref 15–37)
BASOPHILS ABSOLUTE: 0.1 K/UL (ref 0–0.2)
BASOPHILS RELATIVE PERCENT: 1.5 %
BILIRUB SERPL-MCNC: 1.2 MG/DL (ref 0–1)
BUN BLDV-MCNC: 14 MG/DL (ref 7–20)
CALCIUM SERPL-MCNC: 10.1 MG/DL (ref 8.3–10.6)
CHLORIDE BLD-SCNC: 103 MMOL/L (ref 99–110)
CO2: 24 MMOL/L (ref 21–32)
CREAT SERPL-MCNC: 1.4 MG/DL (ref 0.6–1.1)
EOSINOPHILS ABSOLUTE: 0.2 K/UL (ref 0–0.6)
EOSINOPHILS RELATIVE PERCENT: 3.1 %
GFR AFRICAN AMERICAN: 47
GFR NON-AFRICAN AMERICAN: 39
GLUCOSE BLD-MCNC: 100 MG/DL (ref 70–99)
HCT VFR BLD CALC: 43.6 % (ref 36–48)
HEMOGLOBIN: 14.6 G/DL (ref 12–16)
LYMPHOCYTES ABSOLUTE: 1.7 K/UL (ref 1–5.1)
LYMPHOCYTES RELATIVE PERCENT: 32 %
MCH RBC QN AUTO: 28 PG (ref 26–34)
MCHC RBC AUTO-ENTMCNC: 33.4 G/DL (ref 31–36)
MCV RBC AUTO: 84 FL (ref 80–100)
MONOCYTES ABSOLUTE: 0.7 K/UL (ref 0–1.3)
MONOCYTES RELATIVE PERCENT: 13.3 %
NEUTROPHILS ABSOLUTE: 2.7 K/UL (ref 1.7–7.7)
NEUTROPHILS RELATIVE PERCENT: 50.1 %
PDW BLD-RTO: 13.5 % (ref 12.4–15.4)
PLATELET # BLD: 262 K/UL (ref 135–450)
PMV BLD AUTO: 8.2 FL (ref 5–10.5)
POTASSIUM SERPL-SCNC: 3.5 MMOL/L (ref 3.5–5.1)
RBC # BLD: 5.2 M/UL (ref 4–5.2)
SODIUM BLD-SCNC: 140 MMOL/L (ref 136–145)
TOTAL PROTEIN: 7.3 G/DL (ref 6.4–8.2)
WBC # BLD: 5.3 K/UL (ref 4–11)

## 2022-06-30 PROCEDURE — 85025 COMPLETE CBC W/AUTO DIFF WBC: CPT

## 2022-06-30 PROCEDURE — 71046 X-RAY EXAM CHEST 2 VIEWS: CPT

## 2022-06-30 PROCEDURE — 93005 ELECTROCARDIOGRAM TRACING: CPT | Performed by: PHYSICIAN ASSISTANT

## 2022-06-30 PROCEDURE — 36415 COLL VENOUS BLD VENIPUNCTURE: CPT

## 2022-06-30 PROCEDURE — 99285 EMERGENCY DEPT VISIT HI MDM: CPT

## 2022-06-30 PROCEDURE — 80053 COMPREHEN METABOLIC PANEL: CPT

## 2022-06-30 PROCEDURE — 72100 X-RAY EXAM L-S SPINE 2/3 VWS: CPT

## 2022-06-30 RX ORDER — 0.9 % SODIUM CHLORIDE 0.9 %
1000 INTRAVENOUS SOLUTION INTRAVENOUS ONCE
Status: DISCONTINUED | OUTPATIENT
Start: 2022-06-30 | End: 2022-07-01 | Stop reason: HOSPADM

## 2022-06-30 ASSESSMENT — LIFESTYLE VARIABLES
HOW OFTEN DO YOU HAVE A DRINK CONTAINING ALCOHOL: 2-4 TIMES A MONTH
HOW MANY STANDARD DRINKS CONTAINING ALCOHOL DO YOU HAVE ON A TYPICAL DAY: 1 OR 2

## 2022-06-30 ASSESSMENT — PAIN - FUNCTIONAL ASSESSMENT: PAIN_FUNCTIONAL_ASSESSMENT: 0-10

## 2022-06-30 ASSESSMENT — PAIN SCALES - GENERAL: PAINLEVEL_OUTOF10: 10

## 2022-07-01 LAB
EKG ATRIAL RATE: 87 BPM
EKG DIAGNOSIS: NORMAL
EKG P AXIS: 61 DEGREES
EKG P-R INTERVAL: 138 MS
EKG Q-T INTERVAL: 406 MS
EKG QRS DURATION: 124 MS
EKG QTC CALCULATION (BAZETT): 488 MS
EKG R AXIS: -7 DEGREES
EKG T AXIS: 97 DEGREES
EKG VENTRICULAR RATE: 87 BPM

## 2022-07-01 PROCEDURE — 93010 ELECTROCARDIOGRAM REPORT: CPT | Performed by: INTERNAL MEDICINE

## 2022-07-01 ASSESSMENT — ENCOUNTER SYMPTOMS
ABDOMINAL PAIN: 0
COUGH: 0
DIARRHEA: 0
VOMITING: 0
NAUSEA: 1
SHORTNESS OF BREATH: 0
RHINORRHEA: 0

## 2022-07-01 NOTE — ED PROVIDER NOTES
[facility]        Pt Name: Jenise Burton  MRN: [id number]  [place] 1965  Date of evaluation: 6/30/2022  Provider: Adry Dowlnig PA-C  PCP: Lauren Goldstein MD  Note Started: 12:04 AM EDT       MOE. I have evaluated this patient. My supervising physician was available for consultation. CHIEF COMPLAINT       Chief Complaint   Patient presents with    Nausea     Pt felt overheated, nausea, dizziness, pain in back in ribs 10 coming from home       HISTORY OF PRESENT ILLNESS   (Location, Timing/Onset, Context/Setting, Quality, Duration, Modifying Factors, Severity, Associated Signs and Symptoms)  Note limiting factors. Chief Complaint: Concern for heat exhaustion    Jenise Burton is a 65 y.o. female who presents to the emergency department today for evaluation for concern of heat exhaustion, and overheating. The patient states that she has been experiencing pain to her lower back, she states that this is actually been present for the past 2 to 3 days. Patient does have a history of degenerative disc disease, this is not necessarily new complaint. The patient states that it helps when she walks around. The patient states that due to this, she has been more frequently. The patient states that she was walking around outside all day today, she states that she did not drink very much water. Patient states that she began to feel very overheated, she states that she felt nauseous, and states that she overall did not feel well. The patient denies any syncope. She denies feeling dizzy, or lightheaded. Patient states that since sitting down, having water, and being in the air conditioning she is actually feeling much better. She states that she never had chest ain. States that she did not have any shortness of breath. Patient denies any recent illnesses including fever, cough, vomiting or diarrhea. No urinary symptoms. Patient states that she has been able to ambulate since, and otherwise states that she is feeling better. Patient otherwise has no other complaints at this time nursing Notes were all reviewed and agreed with or any disagreements were addressed in the HPI. REVIEW OF SYSTEMS    (2-9 systems for level 4, 10 or more for level 5)     Review of Systems   Constitutional: Positive for fatigue. Negative for activity change, appetite change, chills and fever. HENT: Negative for congestion and rhinorrhea. Respiratory: Negative for cough and shortness of breath. Cardiovascular: Negative for chest pain. Gastrointestinal: Positive for nausea. Negative for abdominal pain, diarrhea and vomiting. Genitourinary: Negative for difficulty urinating, dysuria and hematuria. Neurological: Negative for weakness and numbness. Positives and Pertinent negatives as per HPI. Except as noted above in the ROS, all other systems were reviewed and negative.        PAST MEDICAL HISTORY     Past Medical History:   Diagnosis Date    DDD (degenerative disc disease), lumbosacral 1/15/2014    Depression     Low back pain     HERNIATED DISC    Ovarian cyst          SURGICAL HISTORY     Past Surgical History:   Procedure Laterality Date    BONY PELVIS SURGERY      LEFT SIDE FOR BONE GRAFT FOR NECK    ENDOMETRIAL ABLATION      LEEP      X2    NECK SURGERY      OTHER SURGICAL HISTORY      SPINAL CORD STIMULATOR    TUBAL LIGATION           [address]       Discharge Medication List as of 7/1/2022 12:03 AM      CONTINUE these medications which have NOT CHANGED    Details   ondansetron (ZOFRAN ODT) 4 MG disintegrating tablet Take 1 tablet by mouth every 8 hours as needed for Nausea, Disp-20 tablet, R-0Print      butalbital-APAP-caffeine (FIORICET) -40 MG CAPS per capsule Take 1 capsule by mouth every 4 hours as needed for Headaches or Migraine, Disp-21 capsule, R-0Print      hydrOXYzine (VISTARIL) 50 MG capsule Effort: Pulmonary effort is normal. No respiratory distress. Breath sounds: Normal breath sounds. No wheezing or rales. Musculoskeletal:         General: Normal range of motion. Cervical back: Normal range of motion and neck supple. Comments: No midline spinal tenderness   Skin:     General: Skin is warm and dry. Neurological:      General: No focal deficit present. Mental Status: She is alert and oriented to person, place, and time. GCS: GCS eye subscore is 4. GCS verbal subscore is 5. GCS motor subscore is 6. Cranial Nerves: Cranial nerves are intact. Sensory: Sensation is intact. Motor: Motor function is intact. Coordination: Coordination is intact. Gait: Gait is intact. Psychiatric:         Behavior: Behavior normal.         DIAGNOSTIC RESULTS   LABS:    Labs Reviewed   COMPREHENSIVE METABOLIC PANEL - Abnormal; Notable for the following components:       Result Value    Glucose 100 (*)     CREATININE 1.4 (*)     GFR Non- 39 (*)     GFR  47 (*)     Total Bilirubin 1.2 (*)     All other components within normal limits   CBC WITH AUTO DIFFERENTIAL   URINALYSIS WITH REFLEX TO CULTURE   URINE DRUG SCREEN       When ordered only abnormal lab results are displayed. All other labs were within normal range or not returned as of this dictation. EKG: When ordered, EKG's are interpreted by the Emergency Department Physician in the absence of a cardiologist.  Please see their note for interpretation of EKG. RADIOLOGY:   Non-plain film images such as CT, Ultrasound and MRI are read by the radiologist. Plain radiographic images are visualized and preliminarily interpreted by the ED Provider with the below findings:        Interpretation per the Radiologist below, if available at the time of this note:    XR LUMBAR SPINE (2-3 VIEWS)   Final Result   No acute osseous abnormality.          XR CHEST (2 VW)   Final Result   No acute abnormality. XR CHEST (2 VW)    Result Date: 6/30/2022  EXAMINATION: TWO XRAY VIEWS OF THE CHEST 6/30/2022 9:03 pm COMPARISON: 11/30/2021 HISTORY: Acute chest discomfort. FINDINGS: Cardiomediastinal silhouette and pulmonary vasculature are normal. No consolidation, pleural effusion, or pneumothorax. No acute abnormality. XR LUMBAR SPINE (2-3 VIEWS)    Result Date: 6/30/2022  EXAMINATION: THREE XRAY VIEWS OF THE LUMBAR SPINE 6/30/2022 9:03 pm COMPARISON: 10/02/2020 HISTORY: Acute low back pain. FINDINGS: Normal alignment. No acute fracture seen. Vertebral body heights are maintained. Mild multilevel spondylotic changes without significant disc space narrowing. Visualized soft tissues are unremarkable. No acute osseous abnormality. PROCEDURES   Unless otherwise noted below, none     Procedures    CRITICAL CARE TIME       CONSULTS:  None      EMERGENCY DEPARTMENT COURSE and DIFFERENTIAL DIAGNOSIS/MDM:   Vitals:    Vitals:    06/30/22 2020   BP: 107/72   Pulse: 100   Resp: 16   Temp: 98.1 °F (36.7 °C)   SpO2: 100%       Patient was given the following medications:  Medications   0.9 % sodium chloride bolus (has no administration in time range)         Is this patient to be included in the SEP-1 Core Measure due to severe sepsis or septic shock? No   Exclusion criteria - the patient is NOT to be included for SEP-1 Core Measure due to: Infection is not suspected    Briefly, this is a 78-year-old female who presents to the emergency department today for evaluation for concerns of overheating. The patient states that she was walking around outside today, she states that she did not drink very much water. She states that she began to feel very fatigued, and nauseous. Patient states that since arriving to the ED, sitting in the air conditioning, she states that she is actually feeling much better. Physical exam is unremarkable, no neurological deficits. Vital signs stable.     CBC

## 2022-07-24 ENCOUNTER — HOSPITAL ENCOUNTER (EMERGENCY)
Age: 57
Discharge: HOME | End: 2022-07-25
Attending: EMERGENCY MEDICINE
Payer: MEDICARE

## 2022-07-24 VITALS
HEART RATE: 112 BPM | TEMPERATURE: 98.2 F | WEIGHT: 200 LBS | RESPIRATION RATE: 21 BRPM | OXYGEN SATURATION: 95 % | DIASTOLIC BLOOD PRESSURE: 88 MMHG | SYSTOLIC BLOOD PRESSURE: 119 MMHG | HEIGHT: 69 IN | BODY MASS INDEX: 29.62 KG/M2

## 2022-07-24 DIAGNOSIS — T74.21XA SEXUAL ASSAULT OF ADULT, INITIAL ENCOUNTER: Primary | ICD-10-CM

## 2022-07-24 LAB
A/G RATIO: 2 (ref 1.1–2.2)
ALBUMIN SERPL-MCNC: 4.6 G/DL (ref 3.4–5)
ALP BLD-CCNC: 105 U/L (ref 40–129)
ALT SERPL-CCNC: 18 U/L (ref 10–40)
ANION GAP SERPL CALCULATED.3IONS-SCNC: 10 MMOL/L (ref 3–16)
AST SERPL-CCNC: 19 U/L (ref 15–37)
BASOPHILS ABSOLUTE: 0.1 K/UL (ref 0–0.2)
BASOPHILS RELATIVE PERCENT: 1.2 %
BILIRUB SERPL-MCNC: 0.6 MG/DL (ref 0–1)
BUN BLDV-MCNC: 17 MG/DL (ref 7–20)
CALCIUM SERPL-MCNC: 9.9 MG/DL (ref 8.3–10.6)
CHLORIDE BLD-SCNC: 101 MMOL/L (ref 99–110)
CO2: 26 MMOL/L (ref 21–32)
CREAT SERPL-MCNC: 1.1 MG/DL (ref 0.6–1.1)
EOSINOPHILS ABSOLUTE: 0.1 K/UL (ref 0–0.6)
EOSINOPHILS RELATIVE PERCENT: 2.1 %
GFR AFRICAN AMERICAN: >60
GFR NON-AFRICAN AMERICAN: 51
GLUCOSE BLD-MCNC: 140 MG/DL (ref 70–99)
HCT VFR BLD CALC: 43.3 % (ref 36–48)
HEMOGLOBIN: 14.4 G/DL (ref 12–16)
HIV-1 P24 AG: NORMAL
LYMPHOCYTES ABSOLUTE: 1.5 K/UL (ref 1–5.1)
LYMPHOCYTES RELATIVE PERCENT: 29.1 %
MCH RBC QN AUTO: 28.2 PG (ref 26–34)
MCHC RBC AUTO-ENTMCNC: 33.3 G/DL (ref 31–36)
MCV RBC AUTO: 84.6 FL (ref 80–100)
MONOCYTES ABSOLUTE: 0.6 K/UL (ref 0–1.3)
MONOCYTES RELATIVE PERCENT: 10.7 %
NEUTROPHILS ABSOLUTE: 3 K/UL (ref 1.7–7.7)
NEUTROPHILS RELATIVE PERCENT: 56.9 %
PDW BLD-RTO: 13.7 % (ref 12.4–15.4)
PLATELET # BLD: 280 K/UL (ref 135–450)
PMV BLD AUTO: 8.2 FL (ref 5–10.5)
POTASSIUM REFLEX MAGNESIUM: 4 MMOL/L (ref 3.5–5.1)
RAPID HIV 1&2: NORMAL
RBC # BLD: 5.12 M/UL (ref 4–5.2)
SODIUM BLD-SCNC: 137 MMOL/L (ref 136–145)
TOTAL PROTEIN: 6.9 G/DL (ref 6.4–8.2)
WBC # BLD: 5.3 K/UL (ref 4–11)

## 2022-07-24 PROCEDURE — 36415 COLL VENOUS BLD VENIPUNCTURE: CPT

## 2022-07-24 PROCEDURE — 85025 COMPLETE CBC W/AUTO DIFF WBC: CPT

## 2022-07-24 PROCEDURE — 86701 HIV-1ANTIBODY: CPT

## 2022-07-24 PROCEDURE — 86780 TREPONEMA PALLIDUM: CPT

## 2022-07-24 PROCEDURE — 99284 EMERGENCY DEPT VISIT MOD MDM: CPT

## 2022-07-24 PROCEDURE — 96372 THER/PROPH/DIAG INJ SC/IM: CPT

## 2022-07-24 PROCEDURE — 80053 COMPREHEN METABOLIC PANEL: CPT

## 2022-07-24 RX ORDER — ONDANSETRON 4 MG/1
8 TABLET, ORALLY DISINTEGRATING ORAL ONCE
Status: COMPLETED | OUTPATIENT
Start: 2022-07-24 | End: 2022-07-25

## 2022-07-24 RX ORDER — IBUPROFEN 400 MG/1
400 TABLET ORAL ONCE
Status: COMPLETED | OUTPATIENT
Start: 2022-07-24 | End: 2022-07-25

## 2022-07-24 RX ORDER — CEFTRIAXONE 1 G/1
500 INJECTION, POWDER, FOR SOLUTION INTRAMUSCULAR; INTRAVENOUS ONCE
Status: COMPLETED | OUTPATIENT
Start: 2022-07-24 | End: 2022-07-25

## 2022-07-24 RX ORDER — DOXYCYCLINE HYCLATE 100 MG
100 TABLET ORAL ONCE
Status: COMPLETED | OUTPATIENT
Start: 2022-07-24 | End: 2022-07-25

## 2022-07-25 LAB — TOTAL SYPHILLIS IGG/IGM: NORMAL

## 2022-07-25 PROCEDURE — 6360000002 HC RX W HCPCS: Performed by: EMERGENCY MEDICINE

## 2022-07-25 PROCEDURE — 6370000000 HC RX 637 (ALT 250 FOR IP): Performed by: EMERGENCY MEDICINE

## 2022-07-25 RX ORDER — IBUPROFEN 400 MG/1
400 TABLET ORAL EVERY 6 HOURS PRN
Qty: 120 TABLET | Refills: 0 | Status: SHIPPED | OUTPATIENT
Start: 2022-07-25

## 2022-07-25 RX ORDER — ONDANSETRON 8 MG/1
8 TABLET, ORALLY DISINTEGRATING ORAL EVERY 8 HOURS PRN
Qty: 10 TABLET | Refills: 0 | Status: SHIPPED | OUTPATIENT
Start: 2022-07-25

## 2022-07-25 RX ORDER — DOXYCYCLINE HYCLATE 100 MG
100 TABLET ORAL 2 TIMES DAILY
Qty: 14 TABLET | Refills: 0 | Status: SHIPPED | OUTPATIENT
Start: 2022-07-25 | End: 2022-08-01

## 2022-07-25 RX ORDER — TENOFOVIR DISOPROXIL FUMARATE 300 MG/1
300 TABLET, FILM COATED ORAL DAILY
Qty: 28 TABLET | Refills: 0 | Status: SHIPPED | OUTPATIENT
Start: 2022-07-25 | End: 2022-08-22

## 2022-07-25 RX ADMIN — ONDANSETRON 8 MG: 4 TABLET, ORALLY DISINTEGRATING ORAL at 00:20

## 2022-07-25 RX ADMIN — CEFTRIAXONE SODIUM 500 MG: 1 INJECTION, POWDER, FOR SOLUTION INTRAMUSCULAR; INTRAVENOUS at 00:21

## 2022-07-25 RX ADMIN — IBUPROFEN 400 MG: 400 TABLET, FILM COATED ORAL at 00:20

## 2022-07-25 RX ADMIN — DOXYCYCLINE HYCLATE 100 MG: 100 TABLET, COATED ORAL at 00:20
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
## 2022-11-28 ENCOUNTER — APPOINTMENT (OUTPATIENT)
Dept: CT IMAGING | Age: 57
End: 2022-11-28
Payer: MEDICARE

## 2022-11-28 ENCOUNTER — HOSPITAL ENCOUNTER (EMERGENCY)
Age: 57
Discharge: HOME | End: 2022-11-28
Attending: EMERGENCY MEDICINE
Payer: MEDICARE

## 2022-11-28 VITALS
RESPIRATION RATE: 16 BRPM | HEIGHT: 69 IN | WEIGHT: 207.2 LBS | OXYGEN SATURATION: 95 % | HEART RATE: 93 BPM | BODY MASS INDEX: 30.69 KG/M2 | SYSTOLIC BLOOD PRESSURE: 137 MMHG | TEMPERATURE: 98.4 F | DIASTOLIC BLOOD PRESSURE: 93 MMHG

## 2022-11-28 DIAGNOSIS — M54.2 NECK PAIN: Primary | ICD-10-CM

## 2022-11-28 PROCEDURE — 72125 CT NECK SPINE W/O DYE: CPT

## 2022-11-28 PROCEDURE — 99284 EMERGENCY DEPT VISIT MOD MDM: CPT

## 2022-11-28 PROCEDURE — 6370000000 HC RX 637 (ALT 250 FOR IP): Performed by: EMERGENCY MEDICINE

## 2022-11-28 PROCEDURE — 70450 CT HEAD/BRAIN W/O DYE: CPT

## 2022-11-28 RX ORDER — CYCLOBENZAPRINE HCL 10 MG
10 TABLET ORAL 2 TIMES DAILY PRN
Qty: 20 TABLET | Refills: 0 | Status: SHIPPED | OUTPATIENT
Start: 2022-11-28 | End: 2022-12-08

## 2022-11-28 RX ORDER — CYCLOBENZAPRINE HCL 10 MG
10 TABLET ORAL ONCE
Status: COMPLETED | OUTPATIENT
Start: 2022-11-28 | End: 2022-11-28

## 2022-11-28 RX ADMIN — CYCLOBENZAPRINE 10 MG: 10 TABLET, FILM COATED ORAL at 15:47

## 2022-11-28 ASSESSMENT — PAIN - FUNCTIONAL ASSESSMENT: PAIN_FUNCTIONAL_ASSESSMENT: 0-10

## 2022-11-28 ASSESSMENT — ENCOUNTER SYMPTOMS
DIARRHEA: 0
VOICE CHANGE: 0
NAUSEA: 0
TROUBLE SWALLOWING: 0
SHORTNESS OF BREATH: 0
VOMITING: 0

## 2022-11-28 ASSESSMENT — PAIN SCALES - GENERAL: PAINLEVEL_OUTOF10: 10

## 2022-11-28 ASSESSMENT — PAIN DESCRIPTION - LOCATION: LOCATION: NECK

## 2022-11-28 ASSESSMENT — LIFESTYLE VARIABLES: HOW OFTEN DO YOU HAVE A DRINK CONTAINING ALCOHOL: NEVER
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
## 2023-02-11 ENCOUNTER — HOSPITAL ENCOUNTER (EMERGENCY)
Age: 58
Discharge: HOME | End: 2023-02-11
Payer: MEDICARE

## 2023-02-11 ENCOUNTER — APPOINTMENT (OUTPATIENT)
Dept: CT IMAGING | Age: 58
End: 2023-02-11
Payer: MEDICARE

## 2023-02-11 VITALS
SYSTOLIC BLOOD PRESSURE: 132 MMHG | TEMPERATURE: 97.7 F | BODY MASS INDEX: 28.8 KG/M2 | RESPIRATION RATE: 20 BRPM | DIASTOLIC BLOOD PRESSURE: 78 MMHG | WEIGHT: 195 LBS | OXYGEN SATURATION: 100 % | HEART RATE: 97 BPM

## 2023-02-11 DIAGNOSIS — K59.00 CONSTIPATION, UNSPECIFIED CONSTIPATION TYPE: Primary | ICD-10-CM

## 2023-02-11 DIAGNOSIS — K56.41 FECAL IMPACTION IN RECTUM (HCC): ICD-10-CM

## 2023-02-11 LAB
A/G RATIO: 1.5 (ref 1.1–2.2)
ALBUMIN SERPL-MCNC: 4.4 G/DL (ref 3.4–5)
ALP BLD-CCNC: 112 U/L (ref 40–129)
ALT SERPL-CCNC: 19 U/L (ref 10–40)
ANION GAP SERPL CALCULATED.3IONS-SCNC: 18 MMOL/L (ref 3–16)
AST SERPL-CCNC: 20 U/L (ref 15–37)
BASOPHILS ABSOLUTE: 0.1 K/UL (ref 0–0.2)
BASOPHILS RELATIVE PERCENT: 0.8 %
BILIRUB SERPL-MCNC: 0.6 MG/DL (ref 0–1)
BUN BLDV-MCNC: 17 MG/DL (ref 7–20)
CALCIUM SERPL-MCNC: 10.6 MG/DL (ref 8.3–10.6)
CHLORIDE BLD-SCNC: 100 MMOL/L (ref 99–110)
CO2: 20 MMOL/L (ref 21–32)
CREAT SERPL-MCNC: 1.3 MG/DL (ref 0.6–1.1)
EOSINOPHILS ABSOLUTE: 0 K/UL (ref 0–0.6)
EOSINOPHILS RELATIVE PERCENT: 0.4 %
GFR SERPL CREATININE-BSD FRML MDRD: 48 ML/MIN/{1.73_M2}
GLUCOSE BLD-MCNC: 132 MG/DL (ref 70–99)
HCG QUALITATIVE: NEGATIVE
HCT VFR BLD CALC: 46.2 % (ref 36–48)
HEMOGLOBIN: 15.6 G/DL (ref 12–16)
LYMPHOCYTES ABSOLUTE: 1.5 K/UL (ref 1–5.1)
LYMPHOCYTES RELATIVE PERCENT: 17.2 %
MCH RBC QN AUTO: 28.5 PG (ref 26–34)
MCHC RBC AUTO-ENTMCNC: 33.8 G/DL (ref 31–36)
MCV RBC AUTO: 84.3 FL (ref 80–100)
MONOCYTES ABSOLUTE: 0.8 K/UL (ref 0–1.3)
MONOCYTES RELATIVE PERCENT: 8.7 %
NEUTROPHILS ABSOLUTE: 6.5 K/UL (ref 1.7–7.7)
NEUTROPHILS RELATIVE PERCENT: 72.9 %
PDW BLD-RTO: 13.8 % (ref 12.4–15.4)
PLATELET # BLD: 322 K/UL (ref 135–450)
PMV BLD AUTO: 8.2 FL (ref 5–10.5)
POTASSIUM SERPL-SCNC: 4.1 MMOL/L (ref 3.5–5.1)
RBC # BLD: 5.48 M/UL (ref 4–5.2)
SODIUM BLD-SCNC: 138 MMOL/L (ref 136–145)
TOTAL PROTEIN: 7.3 G/DL (ref 6.4–8.2)
WBC # BLD: 9 K/UL (ref 4–11)

## 2023-02-11 PROCEDURE — 6370000000 HC RX 637 (ALT 250 FOR IP): Performed by: PHYSICIAN ASSISTANT

## 2023-02-11 PROCEDURE — 99284 EMERGENCY DEPT VISIT MOD MDM: CPT

## 2023-02-11 PROCEDURE — 36415 COLL VENOUS BLD VENIPUNCTURE: CPT

## 2023-02-11 PROCEDURE — 6360000002 HC RX W HCPCS: Performed by: PHYSICIAN ASSISTANT

## 2023-02-11 PROCEDURE — 80053 COMPREHEN METABOLIC PANEL: CPT

## 2023-02-11 PROCEDURE — 6370000000 HC RX 637 (ALT 250 FOR IP)

## 2023-02-11 PROCEDURE — 96372 THER/PROPH/DIAG INJ SC/IM: CPT

## 2023-02-11 PROCEDURE — 74176 CT ABD & PELVIS W/O CONTRAST: CPT

## 2023-02-11 PROCEDURE — 84703 CHORIONIC GONADOTROPIN ASSAY: CPT

## 2023-02-11 PROCEDURE — 85025 COMPLETE CBC W/AUTO DIFF WBC: CPT

## 2023-02-11 RX ORDER — LIDOCAINE HYDROCHLORIDE 20 MG/ML
JELLY TOPICAL
Status: COMPLETED
Start: 2023-02-11 | End: 2023-02-11

## 2023-02-11 RX ORDER — LIDOCAINE HYDROCHLORIDE 20 MG/ML
JELLY TOPICAL ONCE
Status: DISCONTINUED | OUTPATIENT
Start: 2023-02-11 | End: 2023-02-11 | Stop reason: HOSPADM

## 2023-02-11 RX ORDER — POLYETHYLENE GLYCOL 3350 17 G/17G
17 POWDER, FOR SOLUTION ORAL DAILY
Qty: 238 G | Refills: 0 | Status: SHIPPED | OUTPATIENT
Start: 2023-02-11 | End: 2023-02-16

## 2023-02-11 RX ORDER — KETOROLAC TROMETHAMINE 30 MG/ML
30 INJECTION, SOLUTION INTRAMUSCULAR; INTRAVENOUS ONCE
Status: COMPLETED | OUTPATIENT
Start: 2023-02-11 | End: 2023-02-11

## 2023-02-11 RX ADMIN — KETOROLAC TROMETHAMINE 30 MG: 30 INJECTION, SOLUTION INTRAMUSCULAR at 12:16

## 2023-02-11 RX ADMIN — LIDOCAINE HYDROCHLORIDE: 20 JELLY TOPICAL at 12:21

## 2023-02-11 RX ADMIN — Medication 240 ML: at 13:50

## 2023-02-11 RX ADMIN — NALOXEGOL OXALATE 25 MG: 25 TABLET, FILM COATED ORAL at 12:16

## 2023-02-11 ASSESSMENT — ENCOUNTER SYMPTOMS
BACK PAIN: 0
COLOR CHANGE: 0
BLOOD IN STOOL: 0
NAUSEA: 0
RECTAL PAIN: 1
DIARRHEA: 0
CONSTIPATION: 1
CHEST TIGHTNESS: 0
ABDOMINAL DISTENTION: 0
SHORTNESS OF BREATH: 0
COUGH: 0
ANAL BLEEDING: 0
ABDOMINAL PAIN: 0
VOMITING: 0
RESPIRATORY NEGATIVE: 1

## 2023-02-11 NOTE — ED NOTES
Pt ambulated to restroom, states she only got a \"small amount of liquid\" out. Returned to bed.       Remedios Miles RN  02/11/23 1073

## 2023-02-11 NOTE — ED NOTES
Pt uncooperative during rectal exam with Annamarie MOODY. Screaming in pain, unable to sit still.       Meagan Stewart RN  02/11/23 8642

## 2023-02-11 NOTE — ED PROVIDER NOTES
[facility]        Pt Name: Renaldo Wilkinson  MRN: [id number]  [place] 1965  Date of evaluation: 2/11/2023  Provider: HEIDY Martinez  PCP: Hernandez Harris MD  Note Started: 11:20 AM EST 2/11/23      MOE. I have evaluated this patient. My supervising physician was available for consultation. CHIEF COMPLAINT       Chief Complaint   Patient presents with    Constipation     Pt in via [facility] from home for reports of constipation, no BM for 10 days. Reports pressure inside rectum. States she has a hx of constipation but \"it's never been this bad. \"       HISTORY OF PRESENT ILLNESS: 1 or more Elements     History From: Patient  Limitations to history : None    Renaldo Wilkinson is a 65 y.o. female with documented history of substance abuse, bipolar disorder, PTSD who presents the ED with complaint of constipation. Patient states he has not had a normal bowel movement in 10 days. Patient states she is been having liquid stool for the past couple of days. Patient states he is a lot of pressure to her rectum. Patient states she has tried home MiraLAX today with minimal improvement of symptoms. States yesterday she tried to have a bowel movement and felt that she had a \"stool ball\" in her rectum. Patient states she stuck her finger up her rectum and attempt to break it up/passed this. States she was unsuccessful. EMS was called today due to continued pain to the rectum and constipation she was brought to the ED for further evaluation and treatment. She admits to substance use disorder. States she used methamphetamine a couple days ago. She denies any opiate usage. She denies any pain medication usage at home. She denies any significant abdominal pain. Denies nausea, vomiting or decreased oral intake. Denies decreased appetite. Denies any urinary symptoms, vaginal bleeding or vaginal discharge.   As documented surgery of endometrial ablation, LEEP procedure x2 and spinal cord stimulator. Nursing Notes were all reviewed and agreed with or any disagreements were addressed in the HPI. REVIEW OF SYSTEMS :      Review of Systems   Constitutional:  Negative for activity change, appetite change, chills, diaphoresis, fatigue and fever. Respiratory: Negative. Negative for cough, chest tightness and shortness of breath. Cardiovascular: Negative. Negative for chest pain, palpitations and leg swelling. Gastrointestinal:  Positive for constipation and rectal pain. Negative for abdominal distention, abdominal pain, anal bleeding, blood in stool, diarrhea, nausea and vomiting. Genitourinary:  Negative for decreased urine volume, difficulty urinating, dysuria, flank pain, frequency, genital sores, hematuria, menstrual problem, pelvic pain, urgency, vaginal bleeding, vaginal discharge and vaginal pain. Musculoskeletal:  Negative for arthralgias, back pain, myalgias, neck pain and neck stiffness. Skin:  Negative for color change, pallor, rash and wound. Neurological:  Negative for dizziness, light-headedness and headaches. Positives and Pertinent negatives as per HPI.      SURGICAL HISTORY     Past Surgical History:   Procedure Laterality Date    BONY PELVIS SURGERY      LEFT SIDE FOR BONE GRAFT FOR NECK    ENDOMETRIAL ABLATION      LEEP      X2    NECK SURGERY      OTHER SURGICAL HISTORY      SPINAL CORD STIMULATOR    TUBAL LIGATION         CURRENTMEDICATIONS       Discharge Medication List as of 2/11/2023  3:01 PM        CONTINUE these medications which have NOT CHANGED    Details   ibuprofen (IBU) 400 MG tablet Take 1 tablet by mouth every 6 hours as needed for Pain, Disp-120 tablet, R-0Print      ondansetron (ZOFRAN ODT) 8 MG TBDP disintegrating tablet Take 1 tablet by mouth every 8 hours as needed for Nausea, Disp-10 tablet, R-0Print      tenofovir disoproxil fumarate (VIREAD) 300 MG tablet Take 1 tablet by mouth in the morning for 28 days. , Disp-28 tablet, R-0Print      dolutegravir sodium (TIVICAY) 50 MG tablet Take 1 tablet by mouth in the morning for 28 days. , Disp-28 tablet, R-0Print      butalbital-APAP-caffeine (FIORICET) -40 MG CAPS per capsule Take 1 capsule by mouth every 4 hours as needed for Headaches or Migraine, Disp-21 capsule, R-0Print      hydrOXYzine (VISTARIL) 50 MG capsule Take 50 mg by mouth 4 times daily as needed for AnxietyHistorical Med      risperiDONE (RISPERDAL) 1 MG tablet Take 1 mg by mouth 2 times dailyHistorical Med      lamoTRIgine (LAMICTAL) 25 MG tablet Take 25 mg by mouth 2 times dailyHistorical Med             ALLERGIES     Latex, Amoxicillin-pot clavulanate, and Gabapentin    FAMILYHISTORY       Family History   Problem Relation Age of Onset    Cancer Other     Diabetes Other     Heart Disease Other     High Blood Pressure Other     Stroke Other         SOCIAL HISTORY       Social History     Tobacco Use    Smoking status: Every Day     Packs/day: 1.00     Types: Cigarettes    Smokeless tobacco: Never   Vaping Use    Vaping Use: Never used   Substance Use Topics    Alcohol use: Yes     Alcohol/week: 0.0 standard drinks     Comment: OCCASIONALLY    Drug use: Not Currently     Types: Methamphetamines (Crystal Meth)       SCREENINGS        [place] Coma Scale  Eye Opening: Spontaneous  Best Verbal Response: Oriented  Best Motor Response: Obeys commands  [place] Coma Scale Score: 15                CIWA Assessment  BP: 132/78  Heart Rate: 97           PHYSICAL EXAM  1 or more Elements     ED Triage Vitals [02/11/23 1039]   BP Temp Temp Source Heart Rate Resp SpO2 Height Weight   (!) 135/91 97.7 °F (36.5 °C) Oral 97 20 100 % -- 195 lb (88.5 kg)       Physical Exam  Constitutional:       General: She is not in acute distress. Appearance: Normal appearance. She is well-developed. She is not ill-appearing, toxic-appearing or diaphoretic.    HENT:      Head: Normocephalic and atraumatic. Right Ear: External ear normal.      Left Ear: External ear normal.   Eyes:      General:         Right eye: No discharge. Left eye: No discharge. Cardiovascular:      Rate and Rhythm: Normal rate and regular rhythm. Pulses: Normal pulses. Heart sounds: No murmur heard. No friction rub. No gallop. Pulmonary:      Effort: Pulmonary effort is normal. No respiratory distress. Breath sounds: Normal breath sounds. No stridor. No wheezing, rhonchi or rales. Chest:      Chest wall: No tenderness. Abdominal:      General: Abdomen is flat. Bowel sounds are normal. There is no distension. Palpations: Abdomen is soft. There is no mass. Tenderness: There is generalized abdominal tenderness. There is no right CVA tenderness, left CVA tenderness, guarding or rebound. Negative signs include Schrader's sign and McBurney's sign. Hernia: No hernia is present. Genitourinary:     Comments: Rectal exam performed by myself with female chaperone at bedside. Patient had difficulty tolerating procedure secondary to pain along the rectum. Upon evaluation of the rectum rectum appears to be dilated with what appears to be large stool ball noted at the rectum. There is some liquid stool noted around the gluteal cleft and to the thighs. There is no hemorrhoids, fissure or fistula noted. No evidence of any abscess. Digital rectal lamination attempted but terminated given patient's discomfort and difficulty tolerating procedure. Musculoskeletal:         General: Normal range of motion. Cervical back: Normal range of motion and neck supple. Skin:     General: Skin is warm and dry. Coloration: Skin is not pale. Findings: No erythema or rash. Neurological:      Mental Status: She is alert and oriented to person, place, and time.    Psychiatric:         Behavior: Behavior normal.           DIAGNOSTIC RESULTS   LABS:    Labs Reviewed   CBC WITH AUTO DIFFERENTIAL - Abnormal; Notable for the following components:       Result Value    RBC 5.48 (*)     All other components within normal limits   COMPREHENSIVE METABOLIC PANEL - Abnormal; Notable for the following components:    CO2 20 (*)     Anion Gap 18 (*)     Glucose 132 (*)     Creatinine 1.3 (*)     Est, Glom Filt Rate 48 (*)     All other components within normal limits   HCG, SERUM, QUALITATIVE       When ordered only abnormal lab results are displayed. All other labs were within normal range or not returned as of this dictation. EKG: When ordered, EKG's are interpreted by the Emergency Department Physician in the absence of a cardiologist.  Please see their note for interpretation of EKG. RADIOLOGY:   Non-plain film images such as CT, Ultrasound and MRI are read by the radiologist. Plain radiographic images are visualized and preliminarily interpreted by the ED Provider with the below findings:        Interpretation per the Radiologist below, if available at the time of this note:    CT ABDOMEN PELVIS WO CONTRAST Additional Contrast? None   Final Result   No acute pathology in the abdomen and pelvis. No results found. No results found.     PROCEDURES   Unless otherwise noted below, none     Procedures    CRITICAL CARE TIME (.cctime)       PAST MEDICAL HISTORY      has a past medical history of DDD (degenerative disc disease), lumbosacral (1/15/2014), Depression, Low back pain, and Ovarian cyst.     EMERGENCY DEPARTMENT COURSE and DIFFERENTIAL DIAGNOSIS/MDM:   Vitals:    Vitals:    02/11/23 1039 02/11/23 1212 02/11/23 1214   BP: (!) 135/91 132/78    Pulse: 97     Resp: 20     Temp: 97.7 °F (36.5 °C)     TempSrc: Oral     SpO2: 100%  100%   Weight: 195 lb (88.5 kg)         Patient was given the following medications:  Medications   lidocaine (XYLOCAINE) 2 % jelly ( Topical Not Given 2/11/23 1223)   ketorolac (TORADOL) injection 30 mg (30 mg IntraMUSCular Given 2/11/23 1216)   milk and molasses enema 240 mL (240 mLs Rectal Given 2/11/23 1350)   naloxegol (MOVANTIK) tablet 25 mg (25 mg Oral Given 2/11/23 1216)   lidocaine (XYLOCAINE) 2 % uro-jet (  Given 2/11/23 1221)             Is this patient to be included in the SEP-1 Core Measure due to severe sepsis or septic shock? No   Exclusion criteria - the patient is NOT to be included for SEP-1 Core Measure due to: Infection is not suspected    Chronic Conditions affecting care:    has a past medical history of DDD (degenerative disc disease), lumbosacral (1/15/2014), Depression, Low back pain, and Ovarian cyst.    CONSULTS: (Who and What was discussed)  None      Social Determinants Significantly Affecting Health : None    Records Reviewed (External and Source) None    CC/HPI Summary, DDx, ED Course, and Reassessment: Patient is a 64-year-old female who presents to the ED with complaint of constipation. Complain of constipation and rectal pain. Upon examination she has what appears to be fecal impaction. Difficulty tolerating rectal exam initially. Did give dose of Toradol and applied lidocaine gel to the rectum. Rectal exam eventually able to be obtained and was able to manually disimpact hard stool ball here in the emergency department. Patient was given milk of molasses enema and had bowel movement here in the emergency department. CBC showed normal white count, hemoglobin and platelets. CMP relatively unremarkable with a creatinine of 1.3. Pregnancy was negative. CT of the abdomen pelvis showed large stool burden but otherwise unremarkable. Patient was given dose of Movantik here in the emergency department. She denies any opiate usage but does have a history of substance use disorder. She admits to methamphetamine use but denies any opiate use. Was given Movantik here in the emergency department given concern for her substance abuse and potential opiate associated constipation.   Constipation most likely secondary to fecal impaction. Fecal impaction was able to be manually disimpacted and had bowel movement here in the emergency department. Believe to be safely discharged home. We will start her on MiraLAX. Follow-up with PCP. Return ED for any worsening symptoms. No evidence of obstruction. I am the Primary Clinician of Record. FINAL IMPRESSION      1. Constipation, unspecified constipation type    2. Fecal impaction in rectum [facility])          DISPOSITION/PLAN     DISPOSITION Decision To Discharge 02/11/2023 02:41:46 PM      PATIENT REFERRED TO:  MD James [phone]  [place] [address]  [phone]    Schedule an appointment as soon as possible for a visit   For a Re-check in  3-5    days.     [facility] Emergency Department  [place] [address]  [phone]  Go to   As needed, If symptoms worsen    DISCHARGE MEDICATIONS:  Discharge Medication List as of 2/11/2023  3:01 PM        START taking these medications    Details   polyethylene glycol (MIRALAX) 17 GM/SCOOP powder Take 17 g by mouth daily for 5 days, Disp-238 g, R-0Normal             DISCONTINUED MEDICATIONS:  Discharge Medication List as of 2/11/2023  3:01 PM                 (Please note that portions of this note were completed with a voice recognition program.  Efforts were made to edit the dictations but occasionally words are mis-transcribed.)    HEIDY Alcantara (electronically signed)       HEIDY Valdez  02/11/23 [id number]

## 2023-02-11 NOTE — ED NOTES
Vandana called for pt. Pt ambulated out ER exit with all belongings. No sign of distress at time of discharge.       Jama Argueta RN  02/11/23 9659

## 2023-02-11 NOTE — ED NOTES
Monitors in place, call light within reach. Pt not tolerating enema at this time. Urojet applied.       Antionette Cook RN  02/11/23 [id number]

## 2024-05-28 NOTE — ED NOTES
PT calling in a prescription of her Anestrozole sent to the [facility] in [place].  Insurance changes in June.  Thanks.    Pt resting comfortably at this time.  at bedside.        Bailey Taylor RN  01/09/21 [id number]

## 2024-07-11 ENCOUNTER — ANESTHESIA EVENT (OUTPATIENT)
Dept: ENDOSCOPY | Age: 59
End: 2024-07-11
Payer: MEDICARE

## 2024-07-12 ENCOUNTER — HOSPITAL ENCOUNTER (OUTPATIENT)
Age: 59
Setting detail: OUTPATIENT SURGERY
Discharge: HOME | End: 2024-07-12
Attending: INTERNAL MEDICINE | Admitting: INTERNAL MEDICINE
Payer: MEDICARE

## 2024-07-12 ENCOUNTER — ANESTHESIA (OUTPATIENT)
Dept: ENDOSCOPY | Age: 59
End: 2024-07-12
Payer: MEDICARE

## 2024-07-12 VITALS
WEIGHT: 213 LBS | OXYGEN SATURATION: 98 % | RESPIRATION RATE: 16 BRPM | HEIGHT: 70 IN | TEMPERATURE: 97 F | SYSTOLIC BLOOD PRESSURE: 131 MMHG | BODY MASS INDEX: 30.49 KG/M2 | DIASTOLIC BLOOD PRESSURE: 81 MMHG | HEART RATE: 77 BPM

## 2024-07-12 DIAGNOSIS — Z86.010 PERSONAL HISTORY OF COLONIC POLYPS: ICD-10-CM

## 2024-07-12 PROCEDURE — 2500000003 HC RX 250 WO HCPCS

## 2024-07-12 PROCEDURE — 6360000002 HC RX W HCPCS

## 2024-07-12 PROCEDURE — 7100000011 HC PHASE II RECOVERY - ADDTL 15 MIN: Performed by: INTERNAL MEDICINE

## 2024-07-12 PROCEDURE — 88305 TISSUE EXAM BY PATHOLOGIST: CPT

## 2024-07-12 PROCEDURE — 3700000000 HC ANESTHESIA ATTENDED CARE: Performed by: INTERNAL MEDICINE

## 2024-07-12 PROCEDURE — 7100000010 HC PHASE II RECOVERY - FIRST 15 MIN: Performed by: INTERNAL MEDICINE

## 2024-07-12 PROCEDURE — 2580000003 HC RX 258: Performed by: ANESTHESIOLOGY

## 2024-07-12 PROCEDURE — 3609010600 HC COLONOSCOPY POLYPECTOMY SNARE/COLD BIOPSY: Performed by: INTERNAL MEDICINE

## 2024-07-12 PROCEDURE — 2709999900 HC NON-CHARGEABLE SUPPLY: Performed by: INTERNAL MEDICINE

## 2024-07-12 PROCEDURE — 3700000001 HC ADD 15 MINUTES (ANESTHESIA): Performed by: INTERNAL MEDICINE

## 2024-07-12 RX ORDER — LIDOCAINE HYDROCHLORIDE 20 MG/ML
INJECTION, SOLUTION INFILTRATION; PERINEURAL PRN
Status: DISCONTINUED | OUTPATIENT
Start: 2024-07-12 | End: 2024-07-12 | Stop reason: SDUPTHER

## 2024-07-12 RX ORDER — PROPOFOL 10 MG/ML
INJECTION, EMULSION INTRAVENOUS CONTINUOUS PRN
Status: DISCONTINUED | OUTPATIENT
Start: 2024-07-12 | End: 2024-07-12 | Stop reason: SDUPTHER

## 2024-07-12 RX ORDER — PLECANATIDE 3 MG/1
TABLET ORAL
COMMUNITY

## 2024-07-12 RX ORDER — PROPOFOL 10 MG/ML
INJECTION, EMULSION INTRAVENOUS PRN
Status: DISCONTINUED | OUTPATIENT
Start: 2024-07-12 | End: 2024-07-12 | Stop reason: SDUPTHER

## 2024-07-12 RX ORDER — SODIUM CHLORIDE, SODIUM LACTATE, POTASSIUM CHLORIDE, CALCIUM CHLORIDE 600; 310; 30; 20 MG/100ML; MG/100ML; MG/100ML; MG/100ML
INJECTION, SOLUTION INTRAVENOUS CONTINUOUS
Status: DISCONTINUED | OUTPATIENT
Start: 2024-07-12 | End: 2024-07-12 | Stop reason: HOSPADM

## 2024-07-12 RX ADMIN — LIDOCAINE HYDROCHLORIDE 100 MG: 20 INJECTION, SOLUTION INFILTRATION; PERINEURAL at 12:56

## 2024-07-12 RX ADMIN — PROPOFOL 200 MCG/KG/MIN: 10 INJECTION, EMULSION INTRAVENOUS at 12:56

## 2024-07-12 RX ADMIN — PROPOFOL 70 MG: 10 INJECTION, EMULSION INTRAVENOUS at 12:56

## 2024-07-12 RX ADMIN — PROPOFOL 30 MG: 10 INJECTION, EMULSION INTRAVENOUS at 13:00

## 2024-07-12 RX ADMIN — SODIUM CHLORIDE, POTASSIUM CHLORIDE, SODIUM LACTATE AND CALCIUM CHLORIDE: 600; 310; 30; 20 INJECTION, SOLUTION INTRAVENOUS at 12:40

## 2024-07-12 ASSESSMENT — PAIN - FUNCTIONAL ASSESSMENT
PAIN_FUNCTIONAL_ASSESSMENT: 0-10
PAIN_FUNCTIONAL_ASSESSMENT: 0-10

## 2024-07-12 NOTE — ANESTHESIA POSTPROCEDURE EVALUATION
Department of Anesthesiology  Postprocedure Note    Patient: Karolina Hodgson  MRN: [id number]  YOB: 1965  Date of evaluation: 7/12/2024    Procedure Summary       Date: 07/12/24 Room / Location: [place] [address] / [facility]    Anesthesia Start: 1252 Anesthesia Stop: 1322    Procedure: COLONOSCOPY POLYPECTOMY SNARE Diagnosis:       Personal history of colonic polyps      (Personal history of colonic polyps [Z86.010])    Surgeons: Sebastián Hayes MD Responsible Provider: Arvin Schroeder MD    Anesthesia Type: MAC ASA Status: 2            Anesthesia Type: No value filed.    Brittany Phase I: Brittany Score: 10    Brittany Phase II: Brittany Score: 10    Anesthesia Post Evaluation    Patient location during evaluation: PACU  Patient participation: complete - patient participated  Level of consciousness: awake  Pain score: 0  Airway patency: patent  Nausea & Vomiting: no nausea  Cardiovascular status: hemodynamically stable  Respiratory status: acceptable  Hydration status: stable  Pain management: adequate    No notable events documented.

## 2024-07-12 NOTE — DISCHARGE INSTRUCTIONS
ENDOSCOPY DISCHARGE INSTRUCTIONS:    Call the physician that did your procedure for any questions or concern:    [facility]: [phone]  DR. COURTNEY LOZADA      ACTIVITY:    There are potential side effects to the medications used for sedation and anesthesia during your procedure.  These include:  Dizziness or light-headedness, confusion or memory loss, delayed reaction times, loss of coordination, nausea and vomiting.  Because of your increased risk for injury, we ask that you observe the following precautions:  For the next 24 hours,  DO NOT operate an automobile, bicycle, motorcycle, , power tools or large equipment of any kind.  Do not drink alcohol, sign any legal documents or make any legal decisions for 24 hours.  Do not bend your head over lower than your heart.  DO sit on the side of bed/couch awhile before getting up.  Plan on bedrest or quiet relaxation today.  You may resume normal activities in 24 hours.    DIET:    Your first meal today should be light, avoiding spicy and fatty foods.  If you tolerate this first meal, then you may advance to your regular diet unless otherwise advised by your physician.    NORMAL SYMPTOMS:  -Mild sore throat if you’ve had an EGD   -Gaseous discomfort    NOTIFY YOUR PHYSICIAN IF THESE SYMPTOMS OCCUR:  1. Fever (greater than 100)  5. Increased abdominal bloating  2. Severe pain    6. Excessive bleeding  3. Nausea and vomiting  7. Chest pain                                                                    4. Chills    8. Shortness of breath    ADDITIONAL INSTRUCTIONS:    Biopsy results: Call [facility] for biopsy results in 1 week          Please review these discharge instructions this evening or tomorrow for  information you may have forgotten.            We want to thank you for choosing the [facility] as your health care provider. We always strive to provide you with excellent care while you are here. You may receive a survey in the mail

## 2024-07-12 NOTE — H&P
Gastroenterology Note                 Pre-operative History and Physical    Patient: Karolina Hodgson  : 1965  CSN:     History Obtained From:   Patient or guardian.      HISTORY OF PRESENT ILLNESS:    The patient is a 59 y.o. female here for colonoscopy for Phx colon polyps, constip    Past Medical History:    Past Medical History:   Diagnosis Date    Bipolar 1 disorder (HCC)     DDD (degenerative disc disease), lumbosacral 01/15/2014    Depression     Low back pain     HERNIATED DISC    Ovarian cyst     Schizo-affective schizophrenia (HCC)      Past Surgical History:    Past Surgical History:   Procedure Laterality Date    BONY PELVIS SURGERY      LEFT SIDE FOR BONE GRAFT FOR NECK    ENDOMETRIAL ABLATION      LEEP      X2    NECK SURGERY      OTHER SURGICAL HISTORY      SPINAL CORD STIMULATOR = has been removed    TUBAL LIGATION       Medications Prior to Admission:   No current facility-administered medications on file prior to encounter.     Current Outpatient Medications on File Prior to Encounter   Medication Sig Dispense Refill    Plecanatide (TRULANCE) 3 MG TABS Take by mouth      ibuprofen (IBU) 400 MG tablet Take 1 tablet by mouth every 6 hours as needed for Pain 120 tablet 0    ondansetron (ZOFRAN ODT) 8 MG TBDP disintegrating tablet Take 1 tablet by mouth every 8 hours as needed for Nausea 10 tablet 0    dolutegravir sodium (TIVICAY) 50 MG tablet Take 1 tablet by mouth in the morning for 28 days. 28 tablet 0    butalbital-APAP-caffeine (FIORICET) -40 MG CAPS per capsule Take 1 capsule by mouth every 4 hours as needed for Headaches or Migraine 21 capsule 0    hydrOXYzine (VISTARIL) 50 MG capsule Take 1 capsule by mouth 4 times daily as needed for Anxiety      risperiDONE (RISPERDAL) 1 MG tablet Take 1 tablet by mouth 2 times daily          Allergies:  Latex, Amoxicillin-pot clavulanate, and Gabapentin      Social History:   Social History     Tobacco Use    Smoking status:

## 2024-07-12 NOTE — ANESTHESIA PRE PROCEDURE
Cardiovascular:Negative CV ROS  Exercise tolerance: good (>4 METS)                    Neuro/Psych:   (+) neuromuscular disease:, psychiatric history (Bipolar, schizophrenia): stable with treatmentdepression/anxiety             GI/Hepatic/Renal: Neg GI/Hepatic/Renal ROS            Endo/Other: Negative Endo/Other ROS                    Abdominal:             Vascular: negative vascular ROS.         Other Findings:             Anesthesia Plan      MAC     ASA 2       Induction: intravenous.      Anesthetic plan and risks discussed with patient.      Plan discussed with CRNA.    Attending anesthesiologist reviewed and agrees with Preprocedure content                MACK COOK MD   7/12/2024

## 2024-07-12 NOTE — PROCEDURES
[facility]  Colonoscopy Note    Patient: Karolina Hodgson  : 1965  Acct#:     Procedure: Colonoscopy with polypectomy (cold snare)    Date:  2024    Surgeon:  SEBASTIÁN LOZADA MD      Anesthesia: IV propofol, per anesthesia    EBL: <50 mL    Indications: Screening for colon cancer  Personal history of colon polyps    Procedure:     An informed consent was obtained from the patient after explanation of indications, benefits, possible risks and complications of the procedure.  The patient was then taken to the endoscopy suite, placed in the left lateral decubitus position, and the above IV anesthesia was administered.    A digital rectal examination was performed and revealed negative without mass, lesions or tenderness.      The Olympus [id number]-H190 video colonoscope was placed in the patient's rectum under digital direction and advanced to the cecum and terminal ileum. The cecum was identified by characteristic anatomy and ballottment.  The prep was adequate.      Findings:  Normal terminal ileum.  A 5 mm sessile polyp in the transverse colon removed via cold snare polypectomy.      The scope was then withdrawn into the rectum and retroflexed.  The retroflexed view of the anal verge and rectum demonstrates small hemorrhoids.     The scope was straightened, the colon was decompressed and the scope was withdrawn from the patient.      The patient tolerated the procedure well and was taken to the PACU in good condition.    Biopsies:  yes      Impression:   Normal terminal ileum.  A 5 mm sessile polyp in the transverse colon removed via cold snare polypectomy.    Recommendations:  Await pathology results    Sebastián Lozada MD  [facility]

## 2024-11-05 NOTE — H&P
Hospital Medicine History & Physical      PCP: Kevin Jane MD    Date of Admission: 10/22/2020    Date of Service: Pt seen/examined on 10/22/20 and Admitted to Inpatient with expected LOS greater than two midnights due to medical therapy. Chief Complaint:  Altered mental status       History Of Present Illness:  Pretty Hull is 48 y.o. female who presented with complaint of hallucination. Symptom onset was acute for a time period of 1 day. The severity is described as severe. The course of his symptoms over time is resolved. The symptoms improved with none and worsened with methamphetamine. The patient's symptom is associated with bipolar disorder. Pretty Hull is 48 y.o. female with history of bipolar disorder, cocaine and methamphetamine abuse  Se presents to the ER with complaint of hallucinations. She tells me that, for the past several months that she had a difficult relationship with her boyfriend. Today she was smoking meth around 1 PM with her boyfriend. She was hallucinating and got involved into verbal altercation with her boyfriend. Her boyfriend then called 911. She was then brought to the hospital.   She has a history of bipolar disorder however she says she has not been taking her medication today. In the ED, her liver enzymes were significantly elevated ALT 1524 and . At time of my interview, she was alert oriented and appropriate.    She engages in a reasonable conversation, thought and speech process not delayed or tangential.     Past Medical History:          Diagnosis Date    DDD (degenerative disc disease), lumbosacral 1/15/2014    Depression     Low back pain     HERNIATED DISC    Ovarian cyst        Past Surgical History:          Procedure Laterality Date    BONY PELVIS SURGERY      LEFT SIDE FOR BONE GRAFT FOR NECK    ENDOMETRIAL ABLATION      LEEP      X2    NECK SURGERY      OTHER SURGICAL HISTORY      SPINAL CORD STIMULATOR    Well controlled, continue lisinopril.  She should monitor blood pressure notify clinic with consistent elevations.  Labs today.    Orders:    TSH     TUBAL LIGATION         Medications Prior to Admission:      Prior to Admission medications    Medication Sig Start Date End Date Taking? Authorizing Provider   hydrOXYzine (VISTARIL) 50 MG capsule Take 50 mg by mouth 4 times daily as needed for Anxiety   Yes Historical Provider, MD   risperiDONE (RISPERDAL) 1 MG tablet Take 1 mg by mouth 2 times daily   Yes Historical Provider, MD   lamoTRIgine (LAMICTAL) 25 MG tablet Take 25 mg by mouth 2 times daily   Yes Historical Provider, MD       Allergies:  Amoxicillin-pot clavulanate    Social History:      The patient currently lives at home with her boyfriend    TOBACCO:   reports that she has been smoking cigarettes. She has been smoking about 1.00 pack per day. She has never used smokeless tobacco.  ETOH:   reports current alcohol use. E-Cigarettes Vaping or Juuling     Questions Responses    Vaping Use Never User    Start Date     Does device contain nicotine? Quit Date     Vaping Type             Family History:      Reviewed in detail and negative for DM, CAD, Cancer, CVA. REVIEW OF SYSTEMS:   Pertinent positives as noted in the HPI. All other systems reviewed and negative. PHYSICAL EXAM PERFORMED:    /64   Pulse 86   Temp 98 °F (36.7 °C) (Oral)   Resp 16   Ht 5' 9.5\" (1.765 m)   Wt 171 lb 6.4 oz (77.7 kg)   LMP 04/20/2015   SpO2 97%   BMI 24.95 kg/m²     General appearance:  No apparent distress, appears stated age and cooperative. HEENT:  Normal cephalic, atraumatic without obvious deformity. Pupils equal, round, and reactive to light. Extra ocular muscles intact. Conjunctivae/corneas clear. Neck: Supple, with full range of motion. No jugular venous distention. Trachea midline. Respiratory:  Normal respiratory effort. Clear to auscultation, bilaterally without Rales/Wheezes/Rhonchi. Cardiovascular:  Regular rate and rhythm with normal S1/S2 without murmurs, rubs or gallops.   Abdomen: Soft, non-tender, non-distended with normal bowel sounds. Musculoskeletal:  No clubbing, cyanosis or edema bilaterally. Full range of motion without deformity. Skin: Skin color, texture, turgor normal.  No rashes or lesions. Neurologic:  Neurovascularly intact without any focal sensory/motor deficits. Cranial nerves: II-XII intact, grossly non-focal.  Psychiatric:  Alert and oriented, thought content appropriate, normal insight  Capillary Refill: Brisk,< 3 seconds   Peripheral Pulses: +2 palpable, equal bilaterally       Labs:     Recent Labs     10/22/20  1654   WBC 5.6   HGB 14.7   HCT 43.0        Recent Labs     10/22/20  1654      K 4.4      CO2 28   BUN 10   CREATININE 1.2*   CALCIUM 9.9     Recent Labs     10/22/20  1654   *   ALT 1,524*   BILITOT 1.9*   ALKPHOS 205*     No results for input(s): INR in the last 72 hours. No results for input(s): Shellia Bugler in the last 72 hours. Urinalysis:      Lab Results   Component Value Date    NITRU Negative 10/22/2020    WBCUA 9 10/22/2020    BACTERIA RARE 10/22/2020    RBCUA 5 10/22/2020    BLOODU Negative 10/22/2020    SPECGRAV 1.017 10/22/2020    GLUCOSEU Negative 10/22/2020       Radiology:     CT abdomen and pelvis with IV contrast: I have reviewed the CT images with the following interpretation: Unremarkable  EKG:  I have reviewed the EKG with the following interpretation: Normal sinus rhythm, no evidence of acute ischemia, left atrial enlargement, QTc 498    CT ABDOMEN PELVIS W IV CONTRAST Additional Contrast? None   Final Result   No acute abnormality identified to explain the patient's elevated liver   enzymes. Moderate amount of stool within the colon. Correlate with any clinical   evidence of constipation. ASSESSMENT:    Principal Problem:    Elevated LFTs  Active Problems:    Bipolar 1 disorder (HCC)    Methamphetamine abuse ([facility] [address]    Cocaine abuse ([facility] [address]  Resolved Problems:    * No resolved hospital problems. *        PLAN:  1. Admit to [facility]  2. Hepatocellular injury -likely drug induced, versus medications related. 3.  Hepatitis panel is ordered and in process  4. There is uncertainties in regards to which psych medications she is actually taking. 5.  Call her pharmacy during the day shift to obtain records of her most refilled medications  6. Consult GI for further input  7. Maintain adequate oral hydration  8. Advised to quit substance abuse      DVT Prophylaxis: Lovenox  Diet: DIET GENERAL;  Code Status: Full Code    PT/OT Eval Status: Not ordered    Dispo - Anticipate discharge in 1 to 2 days       Shamar James MD    Thank you Ton Pretty MD for the opportunity to be involved in this patient's care. If you have any questions or concerns please feel free to contact me at [phone].

## 2025-02-20 ENCOUNTER — HOSPITAL ENCOUNTER (EMERGENCY)
Age: 60
Discharge: HOME | End: 2025-02-20
Attending: EMERGENCY MEDICINE
Payer: MEDICARE

## 2025-02-20 ENCOUNTER — APPOINTMENT (OUTPATIENT)
Dept: CT IMAGING | Age: 60
End: 2025-02-20
Payer: MEDICARE

## 2025-02-20 VITALS
BODY MASS INDEX: 32.73 KG/M2 | TEMPERATURE: 98.5 F | RESPIRATION RATE: 14 BRPM | WEIGHT: 221 LBS | HEIGHT: 69 IN | OXYGEN SATURATION: 96 % | HEART RATE: 88 BPM | SYSTOLIC BLOOD PRESSURE: 128 MMHG | DIASTOLIC BLOOD PRESSURE: 68 MMHG

## 2025-02-20 DIAGNOSIS — M54.2 NECK PAIN: Primary | ICD-10-CM

## 2025-02-20 PROCEDURE — 99284 EMERGENCY DEPT VISIT MOD MDM: CPT

## 2025-02-20 PROCEDURE — 72125 CT NECK SPINE W/O DYE: CPT

## 2025-02-20 RX ORDER — LIDOCAINE 50 MG/G
1 PATCH TOPICAL DAILY
Qty: 10 PATCH | Refills: 0 | Status: SHIPPED | OUTPATIENT
Start: 2025-02-20 | End: 2025-03-02

## 2025-02-20 RX ORDER — METHYLPREDNISOLONE 4 MG/1
TABLET ORAL
Qty: 1 KIT | Refills: 0 | Status: SHIPPED | OUTPATIENT
Start: 2025-02-20 | End: 2025-02-26

## 2025-02-20 RX ORDER — ORPHENADRINE CITRATE 100 MG/1
100 TABLET ORAL 2 TIMES DAILY
Qty: 20 TABLET | Refills: 0 | Status: SHIPPED | OUTPATIENT
Start: 2025-02-20 | End: 2025-03-02

## 2025-02-20 ASSESSMENT — PAIN SCALES - GENERAL: PAINLEVEL_OUTOF10: 7

## 2025-02-20 ASSESSMENT — PAIN - FUNCTIONAL ASSESSMENT: PAIN_FUNCTIONAL_ASSESSMENT: 0-10

## 2025-02-20 ASSESSMENT — PAIN DESCRIPTION - LOCATION: LOCATION: NECK

## 2025-02-20 NOTE — DISCHARGE INSTRUCTIONS
Medrol Dosepak and Norflex as directed.  I have given you primary care referral.  Return for any other emergencies.

## 2025-02-20 NOTE — ED PROVIDER NOTES
[facility] EMERGENCY DEPARTMENT  EMERGENCY DEPARTMENTENCOUNTER      Pt Name: Karolina Hodgson  MRN: [id number]  Birthdate 1965  Date ofevaluation: 2/20/2025  Provider: Evangelista Stuart MD    CHIEF COMPLAINT       Chief Complaint   Patient presents with    Neck Pain     Pt from home c/o moving boxes around and pulling something in back of neck x 10 days ago. States she has had no relief from pain when taking ibuprofen. States she had a few lido patches that has helped but she has ran out. Hx of spinal fusion in neck area.        HPI    HISTORY OF PRESENT ILLNESS   (Location/Symptom, Timing/Onset,Context/Setting, Quality, Duration, Modifying Factors, Severity)  Note limiting factors.   Karolina Hodgson is a 59 y.o. female who presents to the emergency department with neck pain.  This is a 59-year-old female with a history of a cervical spinal fusion in the past who presents with some neck pain she has had for the last 10 days.  The patient states she was lifting some heavy objects when this started.  She denies any numbness or paresthesias.  She denies any other complaints.        NursingNotes were reviewed.    Review of Systems    REVIEW OF SYSTEMS    (2-9 systems for level 4, 10 or more for level 5)     Review of Systems   Constitutional: Negative for fever.   HENT: Negative for rhinorrhea and sore throat.    Eyes: Negative for redness.   Respiratory: Negative for shortness of breath.    Cardiovascular: Negative for chest pain.   Gastrointestinal: Negative for abdominal pain.   Genitourinary: Negative for flank pain.   Neurological: Negative for headaches.   Hematological: Negative for adenopathy.   Psychiatric/Behavioral: Negative for confusion.              Except as noted above the remainder of the review of systems was reviewed and negative.       PAST MEDICAL HISTORY     Past Medical History:   Diagnosis Date    Bipolar 1 disorder (HCC)     DDD (degenerative disc disease), lumbosacral 01/15/2014

## (undated) DEVICE — TRAP SPEC RETRV CLR PLAS POLYP IN LN SUCT QUIK CTCH

## (undated) DEVICE — SNARE COLD DIAMOND 10MM THIN